# Patient Record
Sex: FEMALE | Race: WHITE | NOT HISPANIC OR LATINO | Employment: FULL TIME | URBAN - METROPOLITAN AREA
[De-identification: names, ages, dates, MRNs, and addresses within clinical notes are randomized per-mention and may not be internally consistent; named-entity substitution may affect disease eponyms.]

---

## 2017-10-20 ENCOUNTER — ALLSCRIPTS OFFICE VISIT (OUTPATIENT)
Dept: OTHER | Facility: OTHER | Age: 32
End: 2017-10-20

## 2017-10-21 NOTE — PROGRESS NOTES
Assessment  1  Generalized anxiety disorder (300 02) (F41 1)   2  History of depression (V11 8) (Z86 59)   3  No pertinent family history : Family History   4  Lives with parents   5  Currently works full-time (V49 89) (Z78 9)   6  Palpitations (785 1) (R00 2)    Plan  Generalized anxiety disorder    · Follow-up visit in 1 month Evaluation and Treatment  Follow-up  Status: Hold For -  Scheduling  Requested for: 50APG4413   · Decreasing the stress in your life may help your condition improve ; Status:Complete;    Done: 46NNY8042 12:34PM   · There are many things you can do to help control and end a panic attack ;  Status:Complete;   Done: 64SYB9278 12:34PM   · We recommend desensitization to help reduce your fears ; Status:Complete;   Done:  39QUK1961 12:34PM    Effexor 150 mg PO qd   Consider medical marijuana  Discussed treatment options, stress relief modalities, importance of social network of support  Discussion/Summary    We reviewed the inappropriate use of benzodiazepines and the diversion  I do not think it to be appropriate to continue w/ this but suggest introducing other modalities (yoga, ben chi), counseling  Trial i of an increase in the Effexor for now, consider med cannabis  The patient was counseled regarding risks and benefits of treatment options  total time of encounter was 30 minutes-- and-- 30 minutes was spent counseling  History of Present Illness  The patient is being seen for follow-up of generalized anxiety disorder  The patient reports doing poorly  Comorbid Illnesses: depression  Interval symptoms:  worsened anxiety,-- denies difficulty concentrating,-- denies restlessness,-- denies panic attacks,-- denies sleep disruption-- and-- stable depression  Exercise: She does not exercise regularly  Alcohol: She consumes alcohol  Drug Use: Drug(s) abuse includes prescription drugs-- and-- benzodiazepines  The patient is being seen for follow-up of depression   anxiety Symptoms: poor self image-- and-- irritability  She describes this as moderate in severity-- and-- unchanged  34 y/o WF first visit in --1 1/2 years to f/u BONNY and Depression  She continues to work for [employer] but also continues to be frustrated with life not proceeding in a positive way  She likes some aspects of her job but feels she does not advance because of interpersonal relationships  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 and has applied for a new job at [employer]  She is living with her parents but expresses the want to move out  She is taking Effexor although she reports inconsistency in taking the med  She has, however, been taking clonazepam lately that she gets from a friend  Review of Systems    Constitutional: as noted in HPI  Cardiovascular: palpitations  Respiratory: No complaints of shortness of breath, no wheezing, no cough, no SOB on exertion, no orthopnea, no PND  Gastrointestinal: No complaints of abdominal pain, no constipation, no nausea or vomiting, no diarrhea, no bloody stools  Genitourinary: No complaints of dysuria, no incontinence, no pelvic pain, no dysmenorrhea, no vaginal discharge or bleeding  Musculoskeletal: No complaints of arthralgias, no myalgias, no joint swelling or stiffness, no limb pain or swelling  Integumentary: No complaints of skin rash or lesions, no itching, no skin wounds, no breast pain or lump  Neurological: No complaints of headache, no confusion, no convulsions, no numbness, no dizziness or fainting, no tingling, no limb weakness, no difficulty walking  Psychiatric: as noted in HPI--    The patient presents with complaints of anxiety  Symptoms are improved by medications  Symptoms are made worse by stress, new situations and job issues  Symptoms are unchanged  Endocrine: No complaints of proptosis, no hot flashes, no muscle weakness, no deepening of the voice, no feelings of weakness     Hematologic/Lymphatic: No complaints of swollen glands, no swollen glands in the neck, does not bleed easily, does not bruise easily  ROS reviewed  Past Medical History  1  History of depression (V11 8) (Z86 59)    The active problems and past medical history were reviewed and updated today  Family History  Family History    1  No pertinent family history    The family history was reviewed and updated today  Social History   · Currently works full-time (V49 89) (Z78 9)   · Lives with parents  The social history was reviewed and updated today  The social history was reviewed and is unchanged  Allergies  1  No Known Drug Allergies    Vitals  Vital Signs    Recorded: 23XTY0646 04:01AS   Systolic 444, LUE, Sitting   Diastolic 84, LUE, Sitting   Height 5 ft 6 in   Weight 184 lb    BMI Calculated 29 7   BSA Calculated 1 93     Physical Exam    Constitutional   General appearance: No acute distress, well appearing and well nourished  Pulmonary   Respiratory effort: No increased work of breathing or signs of respiratory distress  Musculoskeletal   Gait and station: Normal     Skin   Skin and subcutaneous tissue: Normal without rashes or lesions  Neurologic   Cranial nerves: Cranial nerves 2-12 intact  Psychiatric   Orientation to person, place, and time: Normal     Mood and affect: Normal   Mood and Affect: anxious,-- frustrated,-- irritable-- and-- tearful  Results/Data  PHQ-9 Adult Depression Screening 20Oct2017 11:37AM User, s     Test Name Result Flag Reference   PHQ-9 Adult Depression Score 15     Over the last two weeks, how often have you been bothered by any of the following problems?   Little interest or pleasure in doing things: Several days - 1  Feeling down, depressed, or hopeless: More than half the days - 2  Trouble falling or staying asleep, or sleeping too much: Several days - 1  Feeling tired or having little energy: More than half the days - 2  Poor appetite or over eating: More than half the days - 2  Feeling bad about yourself - or that you are a failure or have let yourself or your family down: More than half the days - 2  Trouble concentrating on things, such as reading the newspaper or watching television: More than half the days - 2  Moving or speaking so slowly that other people could have noticed  Or the opposite -  being so fidgety or restless that you have been moving around a lot more than usual: More than half the days - 2  Thoughts that you would be better off dead, or of hurting yourself in some way: Several days - 1   PHQ-9 Adult Depression Screening Positive     PHQ-9 Difficulty Level Very difficult     PHQ-9 Severity      Moderately Severe Depression       Future Appointments    Date/Time Provider Specialty Site   11/21/2017 10:15 AM CUBA Mejía   [address] [place] [address]     Signatures   Electronically signed by : CUBA Anderson ; Oct 20 2017 12:37PM EST                       (Author)

## 2017-11-21 ENCOUNTER — ALLSCRIPTS OFFICE VISIT (OUTPATIENT)
Dept: OTHER | Facility: OTHER | Age: 32
End: 2017-11-21

## 2017-11-22 NOTE — PROGRESS NOTES
Assessment    1  Generalized anxiety disorder (300 02) (F41 1)   2  Palpitations (785 1) (R00 2)    Plan  Generalized anxiety disorder    · Continue with our present treatment plan ; Status:Complete;   Done: [id number]:81YR   · There are many things you can do to help control and end a panic attack  ;Status:Complete;   Done: 97NXN2180 11:04AM   · Follow-up visit in 2 months Evaluation and Treatment  Follow-up  Status: Hold For -Scheduling  Requested for: 21Nov2017    Discussion/Summary    Seems improved on the higher Effexor dose with less irritability and anxiousness  We will continue at the present dose for now as she looks into an alternate job situation ([employer])  We also briefly disc'd eventually moving out of her parents' house as this is another stressor for her albeit comfortable  Follow  The patient was counseled regarding instructions for management  total time of encounter was 30 minutes-- and-- 30 minutes was spent counseling  History of Present Illness  The patient is being seen for follow-up of generalized anxiety disorder  The patient reports Improved  She has no comorbid illnesses  She has had no significant interval events  Interval symptoms:  improved anxiety,-- denies difficulty concentrating,-- improved restlessness,-- improved panic attacks-- and-- denies sleep disruption  (Effexor)  Medications:  the patient is adherent to her medication regimen  32 y/o WF for f/u BONNY  At last visit we increased her Effexor and she was to be taking it regularly  She did look into medical marijuana and reports it being available in [place] in the new year  She continues to work for [employer]  Review of Systems   Constitutional: as noted in HPI  Cardiovascular: palpitations, but-- No complaints of slow heart rate, no fast heart rate, no chest pain, no palpitations, no leg claudication, no lower extremity edema    Respiratory: No complaints of shortness of breath, no wheezing, no cough, no SOB on exertion, no orthopnea, no PND  Gastrointestinal: No complaints of abdominal pain, no constipation, no nausea or vomiting, no diarrhea, no bloody stools  Genitourinary: No complaints of dysuria, no incontinence, no pelvic pain, no dysmenorrhea, no vaginal discharge or bleeding  Musculoskeletal: No complaints of arthralgias, no myalgias, no joint swelling or stiffness, no limb pain or swelling  Integumentary: No complaints of skin rash or lesions, no itching, no skin wounds, no breast pain or lump  Neurological: No complaints of headache, no confusion, no convulsions, no numbness, no dizziness or fainting, no tingling, no limb weakness, no difficulty walking  Psychiatric: as noted in HPI--   The patient presents with complaints of anxiety  Symptoms are improved by medications  Symptoms are made worse by stress, new situations and job issues  Symptoms are unchanged  Endocrine: No complaints of proptosis, no hot flashes, no muscle weakness, no deepening of the voice, no feelings of weakness  Hematologic/Lymphatic: No complaints of swollen glands, no swollen glands in the neck, does not bleed easily, does not bruise easily  ROS reviewed  Active Problems  1  Generalized anxiety disorder (300 02) (F41 1)   2  Palpitations (785 1) (R00 2)    Past Medical History  1  History of depression (V11 8) (Z86 59)    The active problems and past medical history were reviewed and updated today  Family History  Family History    1  No pertinent family history    Social History     · Currently works full-time (V49 89) (Z78 9)   · Lives with parents  The social history was reviewed and is unchanged  Allergies  1   No Known Drug Allergies    Vitals  Vital Signs    Recorded: 55UST5917 46:73DS   Systolic 92, LUE, Sitting   Diastolic 60, LUE, Sitting   Height 5 ft 6 in   Weight 184 lb    BMI Calculated 29 7   BSA Calculated 1 93       Physical Exam   Constitutional  General appearance: No acute distress, well appearing and well nourished  Pulmonary  Respiratory effort: No increased work of breathing or signs of respiratory distress  Musculoskeletal  Gait and station: Normal    Skin  Skin and subcutaneous tissue: Normal without rashes or lesions  Neurologic  Cranial nerves: Cranial nerves 2-12 intact  Psychiatric  Orientation to person, place, and time: Normal    Mood and affect: Normal   Mood and Affect: anxious,-- frustrated,-- irritable-- and-- tearful  Future Appointments    Date/Time Provider Specialty Site   01/09/2018 10:00 AM CUBA Eldridge   [address] [place] [address]       Signatures   Electronically signed by : CUBA Gutierrez ; Nov 21 2017 11:05AM EST                       (Author)

## 2018-01-09 ENCOUNTER — ALLSCRIPTS OFFICE VISIT (OUTPATIENT)
Dept: OTHER | Facility: OTHER | Age: 33
End: 2018-01-09

## 2018-01-11 NOTE — PROGRESS NOTES
Assessment   1  Generalized anxiety disorder (300 02) (F41 1)1   2  Oral contraceptive prescribed (V25 01) (Z30 011)     1 Amended By: Thelma Victoria; Markus 10 2018 8:34 AM EST      Plan   Generalized anxiety disorder    · Start: Venlafaxine HCl  MG Oral Tablet Extended Release 24 Hour; Take 1 tablet    daily  Oral contraceptive prescribed    · Start: ClonazePAM 0 5 MG Oral Tablet; TAKE 0 5 TABLET Daily PRN Anxiety   · Start: Fexofenadine HCl - 180 MG Oral Tablet; TAKE 1 TABLET DAILY AS NEEDED FOR    ALLERGIES   · Start: Fluticasone Propionate 50 MCG/ACT Nasal Suspension; USE 2 SPRAYS IN EACH    NOSTRIL ONCE DAILY   · Start: Loestrin Fe 1/20 1-20 MG-MCG Oral Tablet (Norethin Ace-Eth Estrad-FE); TAKE 1    TABLET DAILY AS DIRECTED    Discussion/Summary      BONNY: patient appears to be stable on venlafaxine 150 and 1/2 tablet clonazepam 0 5mg  She is to continue this therapy  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 which will be a better environment and time schedule for her  Follow up in 3 months when patient is back to work  Informed patient that this office will not be prescribing medical marijuana  The patient was counseled regarding instructions for management,-- impressions  Possible side effects of new medications were reviewed with the patient/guardian today  The treatment plan was reviewed with the patient/guardian  The patient/guardian understands and agrees with the treatment plan       Self Referrals: No      Chief Complaint   f/u anxiety      History of Present Illness   34 y/o F presents for f/u anxiety  She has been stable on effexor 150 mg and 1/2 clonazepam 0 5mg tab prn anxiety  She works for [employer] at [employer] and has been off work for 4 weeks due to winter break and has had a huge decrease in stress  Prior to this she felt as though her sx were well managed on her medications   She used a clonazepam about once a week for breakthrough sx, which usually occurred under high pressure at work  She is sleeping well, eating and concentrating well, and denies SI  She expresses an interest in medical marijuana  Review of Systems        Constitutional: No fever, no chills, feels well, no tiredness, no recent weight gain or weight loss  Cardiovascular: No complaints of slow heart rate, no fast heart rate, no chest pain, no palpitations, no leg claudication, no lower extremity edema  Respiratory: No complaints of shortness of breath, no wheezing, no cough, no SOB on exertion, no orthopnea, no PND  Gastrointestinal: No complaints of abdominal pain, no constipation, no nausea or vomiting, no diarrhea, no bloody stools  Neurological: No complaints of headache, no confusion, no convulsions, no numbness, no dizziness or fainting, no tingling, no limb weakness, no difficulty walking  Psychiatric: anxiety, but-- not suicidal,-- no personality change,-- no sleep disturbances,-- no depression-- and-- no emotional problems  ROS reviewed  Active Problems   1  Generalized anxiety disorder (300 02) (F41 1)  2  Oral contraceptive prescribed (V25 01) (Z30 011)  3  Palpitations (785 1) (R00 2)    Past Medical History   1  History of depression (V11 8) (Z86 59)     The active problems and past medical history were reviewed and updated today  Surgical History      The surgical history was reviewed and updated today  Family History   Family History   1  No pertinent family history     The family history was reviewed and updated today  Social History    · Currently works full-time (V49 89) (Z78 9)   · Lives with parents  The social history was reviewed and updated today  The social history was reviewed and is unchanged  Current Meds      The medication list was reviewed and updated today  Allergies   1   No Known Drug Allergies    Vitals   Vital Signs    Recorded: 75UWE1022 08:70LA   Systolic 482, LUE, Sitting   Diastolic 68, LUE, Sitting   Height 5 ft 6 in   Weight 187 lb    BMI Calculated 30 18   BSA Calculated 1 94     Physical Exam        Constitutional      General appearance: No acute distress, well appearing and well nourished  Pulmonary      Respiratory effort: No increased work of breathing or signs of respiratory distress  Auscultation of lungs: Clear to auscultation  Cardiovascular      Auscultation of heart: Normal rate and rhythm, normal S1 and S2, without murmurs  Signatures    Electronically signed by : DG Fernandez; Jan 9 2018  4:29PM EST                       (Author)     Electronically signed by : CUBA Pearce ; Markus 10 2018  8:21AM EST                           Electronically signed by : Anibal Fernandez; Markus 10 2018  8:36AM EST                       (Author)     Electronically signed by :  CUBA Pearce ; Markus 10 2018  8:57AM EST

## 2018-01-13 VITALS
WEIGHT: 184 LBS | BODY MASS INDEX: 29.57 KG/M2 | DIASTOLIC BLOOD PRESSURE: 84 MMHG | SYSTOLIC BLOOD PRESSURE: 100 MMHG | HEIGHT: 66 IN

## 2018-01-14 VITALS
WEIGHT: 184 LBS | SYSTOLIC BLOOD PRESSURE: 92 MMHG | DIASTOLIC BLOOD PRESSURE: 60 MMHG | HEIGHT: 66 IN | BODY MASS INDEX: 29.57 KG/M2

## 2018-01-23 VITALS
SYSTOLIC BLOOD PRESSURE: 102 MMHG | WEIGHT: 187 LBS | BODY MASS INDEX: 30.05 KG/M2 | DIASTOLIC BLOOD PRESSURE: 68 MMHG | HEIGHT: 66 IN

## 2018-02-19 DIAGNOSIS — F41.9 ANXIETY: Primary | ICD-10-CM

## 2018-02-19 RX ORDER — VENLAFAXINE HYDROCHLORIDE 150 MG/1
1 TABLET, EXTENDED RELEASE ORAL DAILY
COMMUNITY
Start: 2018-01-09 | End: 2018-02-19 | Stop reason: SDUPTHER

## 2018-02-20 RX ORDER — VENLAFAXINE HYDROCHLORIDE 150 MG/1
150 TABLET, EXTENDED RELEASE ORAL DAILY
Qty: 30 TABLET | Refills: 1 | Status: SHIPPED | OUTPATIENT
Start: 2018-02-20 | End: 2018-07-26 | Stop reason: SDUPTHER

## 2018-02-27 ENCOUNTER — DOCUMENTATION (OUTPATIENT)
Dept: FAMILY MEDICINE CLINIC | Facility: CLINIC | Age: 33
End: 2018-02-27

## 2018-02-27 NOTE — PROGRESS NOTES
I contacted [facility] regarding the Effexor XR that is obtained for the patient through the [facility] Rued from [facility] informed that once they received the signed Change request to make Dr Jose Luis Vieira the physician instead of Dr Burak Allen, it will take 5 to 7 days for them to process and send the medication to the practice  The change request was faxed on 2/21/18

## 2018-04-05 ENCOUNTER — OFFICE VISIT (OUTPATIENT)
Dept: FAMILY MEDICINE CLINIC | Facility: CLINIC | Age: 33
End: 2018-04-05
Payer: COMMERCIAL

## 2018-04-05 VITALS
OXYGEN SATURATION: 98 % | WEIGHT: 190 LBS | HEIGHT: 67 IN | HEART RATE: 90 BPM | BODY MASS INDEX: 29.82 KG/M2 | DIASTOLIC BLOOD PRESSURE: 64 MMHG | SYSTOLIC BLOOD PRESSURE: 110 MMHG

## 2018-04-05 DIAGNOSIS — F41.1 GENERALIZED ANXIETY DISORDER: Primary | ICD-10-CM

## 2018-04-05 DIAGNOSIS — H05.243 EXOPHTHALMOS, CONSTANT, BILATERAL: ICD-10-CM

## 2018-04-05 PROBLEM — H50.52 EXOPHORIA OF BOTH EYES: Status: RESOLVED | Noted: 2018-04-05 | Resolved: 2018-04-05

## 2018-04-05 PROBLEM — H50.52 EXOPHORIA OF BOTH EYES: Status: ACTIVE | Noted: 2018-04-05

## 2018-04-05 LAB
T3FREE SERPL-MCNC: 2.78 PG/ML (ref 2.3–4.2)
T4 FREE SERPL-MCNC: 0.91 NG/DL (ref 0.76–1.46)
TSH SERPL DL<=0.05 MIU/L-ACNC: 3.26 UIU/ML (ref 0.36–3.74)

## 2018-04-05 PROCEDURE — 99214 OFFICE O/P EST MOD 30 MIN: CPT | Performed by: PHYSICIAN ASSISTANT

## 2018-04-05 PROCEDURE — 84443 ASSAY THYROID STIM HORMONE: CPT | Performed by: PHYSICIAN ASSISTANT

## 2018-04-05 PROCEDURE — 84481 FREE ASSAY (FT-3): CPT | Performed by: PHYSICIAN ASSISTANT

## 2018-04-05 PROCEDURE — 84439 ASSAY OF FREE THYROXINE: CPT | Performed by: PHYSICIAN ASSISTANT

## 2018-04-05 PROCEDURE — 36415 COLL VENOUS BLD VENIPUNCTURE: CPT | Performed by: PHYSICIAN ASSISTANT

## 2018-04-05 RX ORDER — CLONAZEPAM 0.5 MG/1
0.5 TABLET ORAL DAILY PRN
COMMUNITY
Start: 2018-01-09 | End: 2018-04-05 | Stop reason: DRUGHIGH

## 2018-04-05 RX ORDER — FEXOFENADINE HCL 180 MG/1
1 TABLET ORAL DAILY PRN
COMMUNITY
Start: 2018-01-09

## 2018-04-05 RX ORDER — FLUTICASONE PROPIONATE 50 MCG
2 SPRAY, SUSPENSION (ML) NASAL DAILY
COMMUNITY
Start: 2018-01-09

## 2018-04-05 RX ORDER — DIPHENOXYLATE HYDROCHLORIDE AND ATROPINE SULFATE 2.5; .025 MG/1; MG/1
1 TABLET ORAL DAILY
COMMUNITY

## 2018-04-05 RX ORDER — CLONAZEPAM 1 MG/1
0.5 TABLET ORAL 2 TIMES DAILY
Qty: 30 TABLET | Refills: 0 | Status: SHIPPED | OUTPATIENT
Start: 2018-04-05 | End: 2019-09-19 | Stop reason: SDUPTHER

## 2018-04-05 RX ORDER — NORETHINDRONE ACETATE AND ETHINYL ESTRADIOL 1MG-20(21)
1 KIT ORAL DAILY
COMMUNITY
Start: 2018-01-09 | End: 2018-04-12 | Stop reason: SDUPTHER

## 2018-04-05 NOTE — PROGRESS NOTES
Assessment/Plan:    Generalized anxiety disorder  Patient reports good control currently  Continue venlafaxine 150 mg  Refilled clonazepam  Gave Dr Manuelito Richards card- alternative medicine    Exophthalmos, constant, bilateral  Ophthalmologist Dr Ita Velasco concerned for hyperthyroidism-TSH, T3, T4 drawn today  Since patient reports that her eyes were always this way and that there is no acute change, will stop workup after TSH       Diagnoses and all orders for this visit:    Generalized anxiety disorder  -     clonazePAM (KlonoPIN) 1 mg tablet; Take 0 5 tablets (0 5 mg total) by mouth 2 (two) times a day  -     T3, free  -     T4, free  -     TSH, 3rd generation    Exophthalmos, constant, bilateral  -     T3, free  -     T4, free  -     TSH, 3rd generation    Other orders  -     norethindrone-ethinyl estradiol (LOESTRIN FE 1/20) 1-20 MG-MCG per tablet; Take 1 tablet by mouth daily  -     Discontinue: clonazePAM (KlonoPIN) 0 5 mg tablet; Take 0 5 mg by mouth daily as needed  -     fexofenadine (ALLEGRA) 180 MG tablet; Take 1 tablet by mouth daily as needed  -     fluticasone (FLONASE) 50 mcg/act nasal spray; 2 sprays into each nostril daily  -     multivitamin (THERAGRAN) TABS; Take 1 tablet by mouth daily  -     Olive Leaf Extract 150 MG CAPS; Take 3 capsules by mouth daily          Subjective:      Patient ID: Judith Rodríguez is a 33 y o  female  52-year-old female history of generalized anxiety presents for follow-up  Despite stressors at home and at work, patient reports that she has had decent control of her anxiety  She feels stable on venlafaxine 150 mg with clonazepam for breakthrough symptoms  She reports that she is having to use the clonazepam less but that it works very well for when she has severe anxiety  No anhedonia, decreased concentration, change in appetite, suicidal thoughts  She reports she still able to function well at work  She is still trying to get the bakery position    Still interested in medical marijuana to treat her anxiety  Her ophthalmologist is concerned about bulging eyes  Patient reports that she has always had large eyes and that he has been seeing this since she 1st met him when she was 7years old  She has not noticed any change  Her mother has a history of thyroid cancer  She denies weight loss, diarrhea, sweating, hot or cold intolerance  She has had a longstanding history of anxiety with palpitations  Last TSH was in 2015 and was normal        The following portions of the patient's history were reviewed and updated as appropriate: allergies, current medications, past family history, past medical history, past social history, past surgical history and problem list     Review of Systems   Constitutional: Negative for diaphoresis, fatigue and fever  HENT: Negative for congestion, ear pain, hearing loss, postnasal drip, rhinorrhea and sore throat  Eyes: Negative for pain, redness and visual disturbance  Respiratory: Negative for cough, shortness of breath and wheezing  Cardiovascular: Negative for chest pain, palpitations and leg swelling  Gastrointestinal: Negative for anal bleeding, constipation, diarrhea, nausea and vomiting  Endocrine: Negative for polydipsia and polyuria  Genitourinary: Negative for dysuria, flank pain and hematuria  Musculoskeletal: Negative for arthralgias, back pain, joint swelling and myalgias  Skin: Negative for pallor, rash and wound  Neurological: Negative for dizziness, syncope, numbness and headaches  Hematological: Negative for adenopathy  Does not bruise/bleed easily  Psychiatric/Behavioral: Negative for decreased concentration, dysphoric mood, sleep disturbance and suicidal ideas  The patient is nervous/anxious            Objective:      /64 (BP Location: Left arm, Patient Position: Sitting, Cuff Size: Large)   Pulse 90   Ht 5' 7" (1 702 m)   Wt 86 2 kg (190 lb)   SpO2 98%   BMI 29 76 kg/m² Physical Exam   Constitutional: She is oriented to person, place, and time  She appears well-developed and well-nourished  No distress  HENT:   Head: Normocephalic and atraumatic  Mouth/Throat: Oropharynx is clear and moist    Eyes: Conjunctivae and EOM are normal  Pupils are equal, round, and reactive to light  Right eye exhibits no discharge  Left eye exhibits no discharge  No scleral icterus  Neck: Normal range of motion  Neck supple  No thyroid mass and no thyromegaly present  Cardiovascular: Normal rate, regular rhythm and normal heart sounds  Exam reveals no gallop and no friction rub  No murmur heard  Pulmonary/Chest: Effort normal and breath sounds normal  No respiratory distress  She has no wheezes  She has no rales  Musculoskeletal: Normal range of motion  She exhibits no edema  Lymphadenopathy:     She has no cervical adenopathy  Neurological: She is alert and oriented to person, place, and time  No cranial nerve deficit  Skin: Skin is warm and dry  No rash noted  She is not diaphoretic  No erythema  No pallor

## 2018-04-05 NOTE — ASSESSMENT & PLAN NOTE
Ophthalmologist Dr Miriam Busby concerned for hyperthyroidism-TSH, T3, T4 drawn today    Since patient reports that her eyes were always this way and that there is no acute change, will stop workup after TSH

## 2018-04-05 NOTE — ASSESSMENT & PLAN NOTE
Patient reports good control currently  Continue venlafaxine 150 mg    Refilled clonazepam  Gave Dr Roslyn Zapata card- alternative medicine

## 2018-04-12 DIAGNOSIS — Z30.41 FAMILY PLANNING, BCP (BIRTH CONTROL PILLS) MAINTENANCE: Primary | ICD-10-CM

## 2018-04-12 DIAGNOSIS — N94.6 DYSMENORRHEA: ICD-10-CM

## 2018-04-12 RX ORDER — NORETHINDRONE ACETATE AND ETHINYL ESTRADIOL 1MG-20(21)
1 KIT ORAL DAILY
Qty: 84 TABLET | Refills: 3 | Status: SHIPPED | OUTPATIENT
Start: 2018-04-12 | End: 2019-03-14 | Stop reason: SDUPTHER

## 2018-07-12 ENCOUNTER — OFFICE VISIT (OUTPATIENT)
Dept: FAMILY MEDICINE CLINIC | Facility: CLINIC | Age: 33
End: 2018-07-12
Payer: COMMERCIAL

## 2018-07-12 VITALS
HEART RATE: 112 BPM | BODY MASS INDEX: 30.92 KG/M2 | SYSTOLIC BLOOD PRESSURE: 118 MMHG | HEIGHT: 67 IN | OXYGEN SATURATION: 97 % | TEMPERATURE: 98.9 F | WEIGHT: 197 LBS | DIASTOLIC BLOOD PRESSURE: 80 MMHG

## 2018-07-12 DIAGNOSIS — F41.1 GENERALIZED ANXIETY DISORDER: Primary | ICD-10-CM

## 2018-07-12 PROBLEM — H05.243: Status: RESOLVED | Noted: 2018-04-05 | Resolved: 2018-07-12

## 2018-07-12 PROCEDURE — 99213 OFFICE O/P EST LOW 20 MIN: CPT | Performed by: PHYSICIAN ASSISTANT

## 2018-07-12 PROCEDURE — 3008F BODY MASS INDEX DOCD: CPT | Performed by: PHYSICIAN ASSISTANT

## 2018-07-12 NOTE — PROGRESS NOTES
Assessment/Plan:    Generalized anxiety disorder  Patient has been doing very on venlafaxine 150 mg with very sparingly use of klonopin for breakthrough symptoms  She may continue this regimen and f/u 6 months  Suggested she make an appt with GYN to update her PAP       Diagnoses and all orders for this visit:    Generalized anxiety disorder          Subjective:      Patient ID: Daniel Kwan is a 33 y o  female  80-year-old female presents for follow-up generalized anxiety  Anxiety has been stable on venlafaxine 150 mg as well as as needed 0 5 mg clonazepam  She has only used clonazepam about 3 times this summer  She has been adjusting to her role at [employer]  Anxiety is usually provoked by being irritated by coworkers at work  No depression, sleep disturbance, change in appetite, SI    Last pap on chart 2011- pt sees Dr Nghia Rouse and believes it was more recent than this but probably >3 years        The following portions of the patient's history were reviewed and updated as appropriate: allergies, current medications, past family history, past medical history, past social history, past surgical history and problem list     Review of Systems   Constitutional: Negative for appetite change and fatigue  Respiratory: Negative for cough and shortness of breath  Cardiovascular: Negative for chest pain  Psychiatric/Behavioral: Negative for decreased concentration, dysphoric mood, sleep disturbance and suicidal ideas  The patient is nervous/anxious  Objective:      /80   Pulse (!) 112   Temp 98 9 °F (37 2 °C)   Ht 5' 7" (1 702 m)   Wt 89 4 kg (197 lb)   SpO2 97%   BMI 30 85 kg/m²          Physical Exam   Constitutional: She is oriented to person, place, and time  She appears well-developed and well-nourished  No distress  HENT:   Head: Normocephalic and atraumatic     Mouth/Throat: Oropharynx is clear and moist    Eyes: Conjunctivae and EOM are normal  Pupils are equal, round, and reactive to light  Right eye exhibits no discharge  Left eye exhibits no discharge  No scleral icterus  Neck: Normal range of motion  Neck supple  No thyromegaly present  Cardiovascular: Normal rate, regular rhythm and normal heart sounds  Exam reveals no gallop and no friction rub  No murmur heard  Pulmonary/Chest: Effort normal and breath sounds normal  No respiratory distress  She has no wheezes  She has no rales  Musculoskeletal: Normal range of motion  She exhibits no edema  Lymphadenopathy:     She has no cervical adenopathy  Neurological: She is alert and oriented to person, place, and time  No cranial nerve deficit  Skin: Skin is warm and dry  No rash noted  She is not diaphoretic  No erythema  No pallor

## 2018-07-12 NOTE — ASSESSMENT & PLAN NOTE
Patient has been doing very on venlafaxine 150 mg with very sparingly use of klonopin for breakthrough symptoms  She may continue this regimen and f/u 6 months      Suggested she make an appt with GYN to update her PAP

## 2018-07-26 DIAGNOSIS — F41.9 ANXIETY: ICD-10-CM

## 2018-07-26 RX ORDER — VENLAFAXINE HYDROCHLORIDE 150 MG/1
150 TABLET, EXTENDED RELEASE ORAL DAILY
Qty: 30 TABLET | Refills: 11 | Status: SHIPPED | OUTPATIENT
Start: 2018-07-26 | End: 2018-12-03 | Stop reason: SDUPTHER

## 2018-12-03 DIAGNOSIS — F41.9 ANXIETY: ICD-10-CM

## 2018-12-03 RX ORDER — VENLAFAXINE HYDROCHLORIDE 150 MG/1
150 TABLET, EXTENDED RELEASE ORAL DAILY
Qty: 30 TABLET | Refills: 5 | Status: SHIPPED | OUTPATIENT
Start: 2018-12-03 | End: 2019-02-07 | Stop reason: SDUPTHER

## 2018-12-04 ENCOUNTER — TELEPHONE (OUTPATIENT)
Dept: FAMILY MEDICINE CLINIC | Facility: CLINIC | Age: 33
End: 2018-12-04

## 2018-12-17 ENCOUNTER — OFFICE VISIT (OUTPATIENT)
Dept: FAMILY MEDICINE CLINIC | Facility: CLINIC | Age: 33
End: 2018-12-17
Payer: COMMERCIAL

## 2018-12-17 VITALS
DIASTOLIC BLOOD PRESSURE: 74 MMHG | BODY MASS INDEX: 30.76 KG/M2 | WEIGHT: 196 LBS | TEMPERATURE: 98.4 F | HEIGHT: 67 IN | OXYGEN SATURATION: 97 % | HEART RATE: 86 BPM | SYSTOLIC BLOOD PRESSURE: 115 MMHG

## 2018-12-17 DIAGNOSIS — F41.1 GENERALIZED ANXIETY DISORDER: ICD-10-CM

## 2018-12-17 DIAGNOSIS — B02.9 HERPES ZOSTER WITHOUT COMPLICATION: Primary | ICD-10-CM

## 2018-12-17 PROCEDURE — 3008F BODY MASS INDEX DOCD: CPT | Performed by: PHYSICIAN ASSISTANT

## 2018-12-17 PROCEDURE — 99213 OFFICE O/P EST LOW 20 MIN: CPT | Performed by: PHYSICIAN ASSISTANT

## 2018-12-17 RX ORDER — A/SINGAPORE/GP1908/2015 IVR-180 (H1N1) (AN A/MICHIGAN/45/2015-LIKE VIRUS), A/SINGAPORE/GP2050/2015 (H3N2) (AN A/HONG KONG/4801/2014 - LIKE VIRUS), B/UTAH/9/2014 (A B/PHUKET/3073/2013-LIKE VIRUS), B/HONG KONG/259/2010 (A B/BRISBANE/60/08-LIKE VIRUS) 15; 15; 15; 15 UG/.5ML; UG/.5ML; UG/.5ML; UG/.5ML
INJECTION, SUSPENSION INTRAMUSCULAR
Refills: 0 | COMMUNITY
Start: 2018-10-24 | End: 2019-09-11 | Stop reason: SDUPTHER

## 2018-12-17 NOTE — PROGRESS NOTES
Assessment/Plan:    Herpes zoster without complication  Rash resolving and currently asymptomatic  No further tx needed    Generalized anxiety disorder  Doing well  Continue effexor 150 mg as well as prn klonopin  F/u 6 months       Diagnoses and all orders for this visit:    Herpes zoster without complication    Generalized anxiety disorder    Other orders  -     FLUCELVAX QUADRIVALENT; TO BE ADMINISTERED BY PHARMACIST FOR IMMUNIZATION          Subjective:      Patient ID: Sedrick Metzger is a 29 y o  female  28 y/o F presents for f/u shingles and anxiety  About two weeks ago pt had a right sided pruritic rash on her back  [facility], dx shingles, took full course acyclovir  No longer itchy and is resolving  Anxiety has been well controlled with effexor 150 mg daily as well as prn klonopin  Continues to have stress at work  No sleep disturbance, vision change, SI        The following portions of the patient's history were reviewed and updated as appropriate: allergies, current medications, past family history, past medical history, past social history, past surgical history and problem list     Review of Systems   Constitutional: Negative for diaphoresis, fatigue and fever  HENT: Negative for congestion, ear pain, hearing loss, postnasal drip, rhinorrhea and sore throat  Eyes: Negative for pain, redness and visual disturbance  Respiratory: Negative for cough, shortness of breath and wheezing  Cardiovascular: Negative for chest pain, palpitations and leg swelling  Gastrointestinal: Negative for anal bleeding, constipation, diarrhea, nausea and vomiting  Endocrine: Negative for polydipsia and polyuria  Genitourinary: Negative for dysuria, flank pain and hematuria  Musculoskeletal: Negative for arthralgias, back pain, joint swelling and myalgias  Skin: Positive for rash  Negative for pallor and wound  Neurological: Negative for dizziness, syncope, numbness and headaches  Hematological: Negative for adenopathy  Does not bruise/bleed easily  Psychiatric/Behavioral: Positive for agitation  Negative for dysphoric mood, sleep disturbance and suicidal ideas  The patient is nervous/anxious  Objective:      /74   Pulse 86   Temp 98 4 °F (36 9 °C)   Ht 5' 7" (1 702 m)   Wt 88 9 kg (196 lb)   SpO2 97%   BMI 30 70 kg/m²          Physical Exam   Constitutional: She is oriented to person, place, and time  She appears well-developed and well-nourished  No distress  HENT:   Head: Normocephalic and atraumatic  Mouth/Throat: Oropharynx is clear and moist    Eyes: Pupils are equal, round, and reactive to light  Conjunctivae and EOM are normal  Right eye exhibits no discharge  Left eye exhibits no discharge  No scleral icterus  Neck: Normal range of motion  Neck supple  No thyromegaly present  Cardiovascular: Normal rate, regular rhythm and normal heart sounds  Exam reveals no gallop and no friction rub  No murmur heard  Pulmonary/Chest: Effort normal and breath sounds normal  No respiratory distress  She has no wheezes  She has no rales  Musculoskeletal: Normal range of motion  She exhibits no edema  Lymphadenopathy:     She has no cervical adenopathy  Neurological: She is alert and oriented to person, place, and time  No cranial nerve deficit  Skin: Skin is warm and dry  Rash (3 hyperpigmented plaques about 1 cm wide right shoulder blade) noted  She is not diaphoretic  No erythema  No pallor

## 2019-01-07 ENCOUNTER — TELEPHONE (OUTPATIENT)
Dept: FAMILY MEDICINE CLINIC | Facility: CLINIC | Age: 34
End: 2019-01-07

## 2019-01-07 DIAGNOSIS — Z23 NEED FOR SHINGLES VACCINE: Primary | ICD-10-CM

## 2019-01-07 NOTE — TELEPHONE ENCOUNTER
She does not need a script to get the shingrix vaccine, she just has to ask for it at the pharmacy   However I'm unsure whether or not her insurance would pay for it as she is under the age of 49- pharmacy will be able to check this for her

## 2019-01-31 ENCOUNTER — TELEPHONE (OUTPATIENT)
Dept: FAMILY MEDICINE CLINIC | Facility: CLINIC | Age: 34
End: 2019-01-31

## 2019-01-31 NOTE — TELEPHONE ENCOUNTER
Pt came to office inquiring about the PAP for her Effexor  The forms were faxed 11/28/18 and again 1/15/19 with no response and no medication refill  [id number] and sp/w [facility] who stated the patient was denied because Effexor XR is no longer on the PAP program     Called patient and LMOM re: above and asking if she would like new RX to local pharmacy or [facility]

## 2019-02-07 DIAGNOSIS — F41.9 ANXIETY: ICD-10-CM

## 2019-02-08 RX ORDER — VENLAFAXINE HYDROCHLORIDE 150 MG/1
150 TABLET, EXTENDED RELEASE ORAL DAILY
Qty: 90 TABLET | Refills: 1 | Status: SHIPPED | OUTPATIENT
Start: 2019-02-08 | End: 2019-02-11 | Stop reason: SDUPTHER

## 2019-02-11 ENCOUNTER — DOCUMENTATION (OUTPATIENT)
Dept: FAMILY MEDICINE CLINIC | Facility: CLINIC | Age: 34
End: 2019-02-11

## 2019-02-11 DIAGNOSIS — F41.9 ANXIETY: ICD-10-CM

## 2019-02-11 RX ORDER — VENLAFAXINE HYDROCHLORIDE 150 MG/1
150 TABLET, EXTENDED RELEASE ORAL DAILY
Qty: 90 TABLET | Refills: 1 | Status: SHIPPED | OUTPATIENT
Start: 2019-02-11 | End: 2020-02-19 | Stop reason: SDUPTHER

## 2019-03-14 DIAGNOSIS — Z30.41 FAMILY PLANNING, BCP (BIRTH CONTROL PILLS) MAINTENANCE: ICD-10-CM

## 2019-03-14 DIAGNOSIS — N94.6 DYSMENORRHEA: ICD-10-CM

## 2019-03-15 ENCOUNTER — TELEPHONE (OUTPATIENT)
Dept: FAMILY MEDICINE CLINIC | Facility: CLINIC | Age: 34
End: 2019-03-15

## 2019-03-15 NOTE — TELEPHONE ENCOUNTER
[employer] called stating that patient needs a letter of medical necessitate for her Effexor 150 mg once daily for 90 day supply   Letter needs to be faxed to [phone]

## 2019-03-15 NOTE — LETTER
03/19/2019    To whom it may concern:    I am writing on behalf of my patient, Jerrell Membreno, to document the medical necessity of venlafaxine 150 mg tablets once daily for the treatment of generalized anxiety disorder  This letter provides information about the patient's medical history and diagnosis and a statement summarizing my treatment rationale  Patient's history and diagnosis:  Patient has struggled with anxiety and depression for several years  Prior to this dose of venlafaxine, patient had impaired quality of life due to the above illnesses  She was originally on 75 mg and this was titrated up to 150 mg which has offered significant relief and has improved her functionality  Treatment Rationale: It is best to continue Erum's current treatment regimen as it has been very effective for her  Many patient's do not do well when an effective medication is switched, even if it is in the same class as their current medication  This is very specific with antidepressants  Duration: 12 months    Summary:  In summary, Venlafaxine 150 mg tablets once daily has been a very effective regimen for this patient and exchanging for a different medication could result in impaired quality of life  It has been proven that patient's with poor mental health are more prone to physical health problems as well, thus it would be prudent to maintain her on this optimized therapy specific for her needs  Please contact me if any additional information is required to ensure the prompt approval of venlafaxine 150 mg tablets      Sincerely,    Kostas Worthington PA-C  03/19/2019

## 2019-03-18 RX ORDER — NORETHINDRONE ACETATE AND ETHINYL ESTRADIOL 1MG-20(21)
KIT ORAL
Qty: 84 TABLET | Refills: 3 | Status: SHIPPED | OUTPATIENT
Start: 2019-03-18 | End: 2020-02-25 | Stop reason: SDUPTHER

## 2019-04-15 ENCOUNTER — TELEPHONE (OUTPATIENT)
Dept: FAMILY MEDICINE CLINIC | Facility: CLINIC | Age: 34
End: 2019-04-15

## 2019-06-11 ENCOUNTER — OFFICE VISIT (OUTPATIENT)
Dept: FAMILY MEDICINE CLINIC | Facility: CLINIC | Age: 34
End: 2019-06-11
Payer: COMMERCIAL

## 2019-06-11 VITALS
HEIGHT: 67 IN | DIASTOLIC BLOOD PRESSURE: 78 MMHG | OXYGEN SATURATION: 98 % | TEMPERATURE: 97.7 F | HEART RATE: 84 BPM | SYSTOLIC BLOOD PRESSURE: 118 MMHG | BODY MASS INDEX: 30.83 KG/M2 | WEIGHT: 196.4 LBS

## 2019-06-11 DIAGNOSIS — T78.40XD ALLERGIC STATE, SUBSEQUENT ENCOUNTER: ICD-10-CM

## 2019-06-11 DIAGNOSIS — Z13.220 SCREENING, LIPID: ICD-10-CM

## 2019-06-11 DIAGNOSIS — F41.1 GENERALIZED ANXIETY DISORDER: Primary | ICD-10-CM

## 2019-06-11 DIAGNOSIS — F32.A CHRONIC DEPRESSION: ICD-10-CM

## 2019-06-11 PROCEDURE — 3008F BODY MASS INDEX DOCD: CPT | Performed by: FAMILY MEDICINE

## 2019-06-11 PROCEDURE — 99214 OFFICE O/P EST MOD 30 MIN: CPT | Performed by: FAMILY MEDICINE

## 2019-06-11 RX ORDER — GLUCOSAMINE/D3/BOSWELLIA SERRA 1500MG-400
TABLET ORAL
COMMUNITY
End: 2020-01-03 | Stop reason: ALTCHOICE

## 2019-06-11 RX ORDER — NORETHINDRONE ACETATE AND ETHINYL ESTRADIOL 1MG-20(21)
KIT ORAL
COMMUNITY
Start: 2015-09-19 | End: 2019-09-11 | Stop reason: ALTCHOICE

## 2019-06-11 RX ORDER — NORETHINDRONE ACETATE AND ETHINYL ESTRADIOL 1MG-20(21)
KIT ORAL
COMMUNITY
Start: 2019-03-18 | End: 2019-09-11 | Stop reason: SDUPTHER

## 2019-06-12 ENCOUNTER — PATIENT OUTREACH (OUTPATIENT)
Dept: FAMILY MEDICINE CLINIC | Facility: CLINIC | Age: 34
End: 2019-06-12

## 2019-06-14 ENCOUNTER — APPOINTMENT (OUTPATIENT)
Dept: LAB | Facility: CLINIC | Age: 34
End: 2019-06-14
Payer: COMMERCIAL

## 2019-06-14 DIAGNOSIS — F41.1 GENERALIZED ANXIETY DISORDER: ICD-10-CM

## 2019-06-14 DIAGNOSIS — Z13.220 SCREENING, LIPID: ICD-10-CM

## 2019-06-14 DIAGNOSIS — F32.A CHRONIC DEPRESSION: ICD-10-CM

## 2019-06-14 LAB
ALBUMIN SERPL BCP-MCNC: 3.6 G/DL (ref 3.5–5)
ALP SERPL-CCNC: 65 U/L (ref 46–116)
ALT SERPL W P-5'-P-CCNC: 18 U/L (ref 12–78)
ANION GAP SERPL CALCULATED.3IONS-SCNC: 7 MMOL/L (ref 4–13)
AST SERPL W P-5'-P-CCNC: 8 U/L (ref 5–45)
BASOPHILS # BLD AUTO: 0.04 THOUSANDS/ΜL (ref 0–0.1)
BASOPHILS NFR BLD AUTO: 1 % (ref 0–1)
BILIRUB SERPL-MCNC: 0.22 MG/DL (ref 0.2–1)
BUN SERPL-MCNC: 11 MG/DL (ref 5–25)
CALCIUM SERPL-MCNC: 9 MG/DL (ref 8.3–10.1)
CHLORIDE SERPL-SCNC: 106 MMOL/L (ref 100–108)
CHOLEST SERPL-MCNC: 214 MG/DL (ref 50–200)
CO2 SERPL-SCNC: 25 MMOL/L (ref 21–32)
CREAT SERPL-MCNC: 0.96 MG/DL (ref 0.6–1.3)
EOSINOPHIL # BLD AUTO: 0.07 THOUSAND/ΜL (ref 0–0.61)
EOSINOPHIL NFR BLD AUTO: 1 % (ref 0–6)
ERYTHROCYTE [DISTWIDTH] IN BLOOD BY AUTOMATED COUNT: 13 % (ref 11.6–15.1)
GFR SERPL CREATININE-BSD FRML MDRD: 79 ML/MIN/1.73SQ M
GLUCOSE P FAST SERPL-MCNC: 84 MG/DL (ref 65–99)
HCT VFR BLD AUTO: 42 % (ref 34.8–46.1)
HDLC SERPL-MCNC: 52 MG/DL (ref 40–60)
HGB BLD-MCNC: 13.4 G/DL (ref 11.5–15.4)
IMM GRANULOCYTES # BLD AUTO: 0.01 THOUSAND/UL (ref 0–0.2)
IMM GRANULOCYTES NFR BLD AUTO: 0 % (ref 0–2)
LDLC SERPL CALC-MCNC: 125 MG/DL (ref 0–100)
LYMPHOCYTES # BLD AUTO: 2.16 THOUSANDS/ΜL (ref 0.6–4.47)
LYMPHOCYTES NFR BLD AUTO: 39 % (ref 14–44)
MCH RBC QN AUTO: 30.1 PG (ref 26.8–34.3)
MCHC RBC AUTO-ENTMCNC: 31.9 G/DL (ref 31.4–37.4)
MCV RBC AUTO: 94 FL (ref 82–98)
MONOCYTES # BLD AUTO: 0.42 THOUSAND/ΜL (ref 0.17–1.22)
MONOCYTES NFR BLD AUTO: 8 % (ref 4–12)
NEUTROPHILS # BLD AUTO: 2.82 THOUSANDS/ΜL (ref 1.85–7.62)
NEUTS SEG NFR BLD AUTO: 51 % (ref 43–75)
NRBC BLD AUTO-RTO: 0 /100 WBCS
PLATELET # BLD AUTO: 334 THOUSANDS/UL (ref 149–390)
PMV BLD AUTO: 9.8 FL (ref 8.9–12.7)
POTASSIUM SERPL-SCNC: 3.8 MMOL/L (ref 3.5–5.3)
PROT SERPL-MCNC: 7.8 G/DL (ref 6.4–8.2)
RBC # BLD AUTO: 4.45 MILLION/UL (ref 3.81–5.12)
SODIUM SERPL-SCNC: 138 MMOL/L (ref 136–145)
T4 FREE SERPL-MCNC: 0.94 NG/DL (ref 0.76–1.46)
TRIGL SERPL-MCNC: 186 MG/DL
TSH SERPL DL<=0.05 MIU/L-ACNC: 4.73 UIU/ML (ref 0.36–3.74)
WBC # BLD AUTO: 5.52 THOUSAND/UL (ref 4.31–10.16)

## 2019-06-14 PROCEDURE — 80053 COMPREHEN METABOLIC PANEL: CPT

## 2019-06-14 PROCEDURE — 84443 ASSAY THYROID STIM HORMONE: CPT

## 2019-06-14 PROCEDURE — 84439 ASSAY OF FREE THYROXINE: CPT

## 2019-06-14 PROCEDURE — 80061 LIPID PANEL: CPT

## 2019-06-14 PROCEDURE — 85025 COMPLETE CBC W/AUTO DIFF WBC: CPT

## 2019-06-14 PROCEDURE — 36415 COLL VENOUS BLD VENIPUNCTURE: CPT

## 2019-06-17 ENCOUNTER — PATIENT OUTREACH (OUTPATIENT)
Dept: FAMILY MEDICINE CLINIC | Facility: CLINIC | Age: 34
End: 2019-06-17

## 2019-07-03 ENCOUNTER — PATIENT OUTREACH (OUTPATIENT)
Dept: FAMILY MEDICINE CLINIC | Facility: CLINIC | Age: 34
End: 2019-07-03

## 2019-07-03 ENCOUNTER — TELEPHONE (OUTPATIENT)
Dept: FAMILY MEDICINE CLINIC | Facility: CLINIC | Age: 34
End: 2019-07-03

## 2019-07-03 NOTE — PROGRESS NOTES
This CM contacted patient to inform her that her Effexor XR (Venlafaxine HCL) medication had come in and when would she be able to pick it up  Patient stated she would come in today to pick it up

## 2019-07-03 NOTE — TELEPHONE ENCOUNTER
Her labs okay except her total cholesterol ands triglyceride are slightly high   To follow with low-fat diet    Check check urinalysis the H     TSH slightly above normal   Check check TSH and lipid profile in  3 months

## 2019-07-08 ENCOUNTER — TRANSCRIBE ORDERS (OUTPATIENT)
Dept: FAMILY MEDICINE CLINIC | Facility: CLINIC | Age: 34
End: 2019-07-08

## 2019-07-08 DIAGNOSIS — E78.5 HYPERLIPIDEMIA, UNSPECIFIED HYPERLIPIDEMIA TYPE: ICD-10-CM

## 2019-07-08 DIAGNOSIS — R79.89 ELEVATED TSH: Primary | ICD-10-CM

## 2019-09-11 ENCOUNTER — OFFICE VISIT (OUTPATIENT)
Dept: FAMILY MEDICINE CLINIC | Facility: CLINIC | Age: 34
End: 2019-09-11
Payer: COMMERCIAL

## 2019-09-11 VITALS — DIASTOLIC BLOOD PRESSURE: 70 MMHG | SYSTOLIC BLOOD PRESSURE: 118 MMHG | TEMPERATURE: 97.9 F | HEART RATE: 100 BPM

## 2019-09-11 DIAGNOSIS — E78.2 MIXED HYPERLIPIDEMIA: Primary | ICD-10-CM

## 2019-09-11 DIAGNOSIS — N94.6 DYSMENORRHEA: ICD-10-CM

## 2019-09-11 DIAGNOSIS — Z23 IMMUNIZATION DUE: ICD-10-CM

## 2019-09-11 DIAGNOSIS — T78.40XD ALLERGIC STATE, SUBSEQUENT ENCOUNTER: ICD-10-CM

## 2019-09-11 DIAGNOSIS — R79.89 TSH ELEVATION: ICD-10-CM

## 2019-09-11 DIAGNOSIS — F32.A CHRONIC DEPRESSION: ICD-10-CM

## 2019-09-11 DIAGNOSIS — F41.1 GENERALIZED ANXIETY DISORDER: ICD-10-CM

## 2019-09-11 PROCEDURE — 99214 OFFICE O/P EST MOD 30 MIN: CPT | Performed by: FAMILY MEDICINE

## 2019-09-15 NOTE — PROGRESS NOTES
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 at the school            Subjective:     Patient ID: Lui Ng is a 36 y o  female      Patient is here for follow-up on her chronic medical problem  Elevated TSH  Patient denied weight gain, fatigue, or cold intolerance  Anxiety  Patient stated her anxiety is under control  Also she feel stressed at work     Denied side effect with the medication  Depression  Also her depression under control  Denied suicide or homicide  Allergy  Controlled  Obesity  Patient stated her weight is stable    Last menstrual   Patient does not get her  She is on birth control  She is not sexually active  Patient is following with gyn    Test results  Lab done on July 8, 2019  Discussed result with patient      Review of Systems   Constitutional: Negative for activity change, appetite change, chills, fatigue, fever and unexpected weight change  HENT: Negative for congestion, ear discharge, ear pain, hearing loss, nosebleeds, rhinorrhea, sinus pressure, sore throat, tinnitus, trouble swallowing and voice change  Eyes: Negative for photophobia, pain and visual disturbance  Respiratory: Negative for cough, chest tightness, shortness of breath and wheezing  Cardiovascular: Negative for chest pain, palpitations and leg swelling  Gastrointestinal: Negative for abdominal pain, anal bleeding, blood in stool, constipation, diarrhea, nausea and vomiting  Endocrine: Negative for cold intolerance, heat intolerance, polydipsia and polyuria  Genitourinary: Negative for dysuria, frequency, hematuria and urgency  Musculoskeletal: Negative for arthralgias, back pain, gait problem, joint swelling, myalgias and neck pain  Skin: Negative for rash  Neurological: Negative for dizziness, tremors, seizures, syncope, weakness, light-headedness and headaches  Hematological: Negative for adenopathy  Does not bruise/bleed easily  Psychiatric/Behavioral: Negative for agitation, behavioral problems, confusion, dysphoric mood, hallucinations and sleep disturbance  The patient is not nervous/anxious  Objective:     Physical Exam   Constitutional: She is oriented to person, place, and time  She appears well-developed and well-nourished  No distress  HENT:   Head: Normocephalic and atraumatic  Right Ear: External ear normal    Left Ear: External ear normal    Nose: Nose normal    Mouth/Throat: Oropharynx is clear and moist  No oropharyngeal exudate  Eyes: Pupils are equal, round, and reactive to light  Conjunctivae and EOM are normal  No scleral icterus  Neck: Neck supple  No JVD present  No thyromegaly present  Cardiovascular: Normal rate, regular rhythm and normal heart sounds  No murmur heard  Pulses:       Carotid pulses are 3+ on the right side, and 3+ on the left side  Extremities  No edema   Pulmonary/Chest: Effort normal and breath sounds normal    Abdominal: Soft  Bowel sounds are normal  She exhibits no distension and no mass  There is no tenderness  There is no rebound and no guarding  No hernia  Lymphadenopathy:     She has no cervical adenopathy  Neurological: She is alert and oriented to person, place, and time  No cranial nerve deficit  She exhibits normal muscle tone  Coordination normal    Skin: No rash noted  Psychiatric: She has a normal mood and affect   Her behavior is normal  Judgment and thought content normal

## 2019-09-19 ENCOUNTER — TELEPHONE (OUTPATIENT)
Dept: FAMILY MEDICINE CLINIC | Facility: CLINIC | Age: 34
End: 2019-09-19

## 2019-09-19 DIAGNOSIS — F41.1 GENERALIZED ANXIETY DISORDER: ICD-10-CM

## 2019-09-19 RX ORDER — CLONAZEPAM 1 MG/1
0.5 TABLET ORAL 2 TIMES DAILY PRN
Qty: 30 TABLET | Refills: 0 | Status: SHIPPED | OUTPATIENT
Start: 2019-09-19 | End: 2020-07-07 | Stop reason: SDUPTHER

## 2019-09-24 ENCOUNTER — TELEPHONE (OUTPATIENT)
Dept: FAMILY MEDICINE CLINIC | Facility: CLINIC | Age: 34
End: 2019-09-24

## 2019-09-24 NOTE — TELEPHONE ENCOUNTER
I reviewed her immunization record  There is no documentation about her MMR  But if patient would like to take booster  It is okay  , just make sure she is not pregnant, or had reaction to it in the past

## 2019-09-24 NOTE — TELEPHONE ENCOUNTER
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

## 2019-09-25 ENCOUNTER — CLINICAL SUPPORT (OUTPATIENT)
Dept: FAMILY MEDICINE CLINIC | Facility: CLINIC | Age: 34
End: 2019-09-25
Payer: COMMERCIAL

## 2019-09-25 DIAGNOSIS — Z23 NEED FOR VACCINATION: Primary | ICD-10-CM

## 2019-09-25 DIAGNOSIS — Z23 NEED FOR HEPATITIS VACCINATION: ICD-10-CM

## 2019-09-25 DIAGNOSIS — K22.5 ZENKER DIVERTICULA: ICD-10-CM

## 2019-09-25 PROCEDURE — 90471 IMMUNIZATION ADMIN: CPT

## 2019-09-25 PROCEDURE — 90707 MMR VACCINE SC: CPT

## 2019-09-25 NOTE — TELEPHONE ENCOUNTER
Patient states she is not pregnant and never had a reaction in the past to immunizations  She is coming in today for a MMR booster

## 2019-12-30 ENCOUNTER — TELEPHONE (OUTPATIENT)
Dept: FAMILY MEDICINE CLINIC | Facility: CLINIC | Age: 34
End: 2019-12-30

## 2019-12-30 ENCOUNTER — LAB (OUTPATIENT)
Dept: LAB | Facility: CLINIC | Age: 34
End: 2019-12-30
Payer: COMMERCIAL

## 2019-12-30 DIAGNOSIS — E03.9 ACQUIRED HYPOTHYROIDISM: Primary | ICD-10-CM

## 2019-12-30 DIAGNOSIS — E78.5 HYPERLIPIDEMIA, UNSPECIFIED HYPERLIPIDEMIA TYPE: ICD-10-CM

## 2019-12-30 DIAGNOSIS — R79.89 ELEVATED TSH: ICD-10-CM

## 2019-12-30 LAB
CHOLEST SERPL-MCNC: 232 MG/DL (ref 50–200)
HDLC SERPL-MCNC: 58 MG/DL
LDLC SERPL CALC-MCNC: 144 MG/DL (ref 0–100)
TRIGL SERPL-MCNC: 151 MG/DL
TSH SERPL DL<=0.05 MIU/L-ACNC: 9.83 UIU/ML (ref 0.36–3.74)

## 2019-12-30 PROCEDURE — 84443 ASSAY THYROID STIM HORMONE: CPT

## 2019-12-30 PROCEDURE — 36415 COLL VENOUS BLD VENIPUNCTURE: CPT

## 2019-12-30 PROCEDURE — 80061 LIPID PANEL: CPT

## 2019-12-30 RX ORDER — LEVOTHYROXINE SODIUM 0.03 MG/1
25 TABLET ORAL
Qty: 30 TABLET | Refills: 5 | Status: SHIPPED | OUTPATIENT
Start: 2019-12-30 | End: 2020-02-19 | Stop reason: SDUPTHER

## 2019-12-30 NOTE — RESULT ENCOUNTER NOTE
Please call the patient regarding her results  Thyroid under active which may explain fatigue, depression, and weight gain  Begin levothyroxine 25 mcg daily  Prescription sent to drugstore  Thyroid should be recheck in about 2 months  Cholesterol is fine

## 2019-12-30 NOTE — TELEPHONE ENCOUNTER
----- Message from Yonathan Mccloud MD sent at 12/30/2019  4:03 PM EST -----  Please call the patient regarding her results  Thyroid under active which may explain fatigue, depression, and weight gain  Begin levothyroxine 25 mcg daily  Prescription sent to drugstore  Thyroid should be recheck in about 2 months  Cholesterol is fine

## 2019-12-31 ENCOUNTER — APPOINTMENT (OUTPATIENT)
Dept: LAB | Facility: CLINIC | Age: 34
End: 2019-12-31
Payer: COMMERCIAL

## 2019-12-31 LAB
BACTERIA UR QL AUTO: ABNORMAL /HPF
BILIRUB UR QL STRIP: NEGATIVE
CLARITY UR: ABNORMAL
COLOR UR: YELLOW
GLUCOSE UR STRIP-MCNC: NEGATIVE MG/DL
HGB UR QL STRIP.AUTO: NEGATIVE
KETONES UR STRIP-MCNC: NEGATIVE MG/DL
LEUKOCYTE ESTERASE UR QL STRIP: ABNORMAL
NITRITE UR QL STRIP: POSITIVE
NON-SQ EPI CELLS URNS QL MICRO: ABNORMAL /HPF
PH UR STRIP.AUTO: 7 [PH]
PROT UR STRIP-MCNC: NEGATIVE MG/DL
RBC #/AREA URNS AUTO: ABNORMAL /HPF
SP GR UR STRIP.AUTO: 1.03 (ref 1–1.03)
UROBILINOGEN UR QL STRIP.AUTO: 0.2 E.U./DL
WBC #/AREA URNS AUTO: ABNORMAL /HPF

## 2019-12-31 PROCEDURE — 81001 URINALYSIS AUTO W/SCOPE: CPT | Performed by: FAMILY MEDICINE

## 2020-01-03 ENCOUNTER — OFFICE VISIT (OUTPATIENT)
Dept: FAMILY MEDICINE CLINIC | Facility: CLINIC | Age: 35
End: 2020-01-03
Payer: COMMERCIAL

## 2020-01-03 ENCOUNTER — TELEPHONE (OUTPATIENT)
Dept: FAMILY MEDICINE CLINIC | Facility: CLINIC | Age: 35
End: 2020-01-03

## 2020-01-03 VITALS
BODY MASS INDEX: 31.52 KG/M2 | SYSTOLIC BLOOD PRESSURE: 92 MMHG | OXYGEN SATURATION: 98 % | TEMPERATURE: 98.9 F | DIASTOLIC BLOOD PRESSURE: 62 MMHG | HEART RATE: 100 BPM | WEIGHT: 200.8 LBS | HEIGHT: 67 IN

## 2020-01-03 DIAGNOSIS — E78.2 MIXED HYPERLIPIDEMIA: ICD-10-CM

## 2020-01-03 DIAGNOSIS — F32.A CHRONIC DEPRESSION: ICD-10-CM

## 2020-01-03 DIAGNOSIS — R82.90 ABNORMAL URINE FINDING: ICD-10-CM

## 2020-01-03 DIAGNOSIS — F41.1 GENERALIZED ANXIETY DISORDER: ICD-10-CM

## 2020-01-03 DIAGNOSIS — E03.9 ACQUIRED HYPOTHYROIDISM: Primary | ICD-10-CM

## 2020-01-03 DIAGNOSIS — T78.40XD ALLERGIC STATE, SUBSEQUENT ENCOUNTER: ICD-10-CM

## 2020-01-03 PROCEDURE — 99214 OFFICE O/P EST MOD 30 MIN: CPT | Performed by: FAMILY MEDICINE

## 2020-01-03 PROCEDURE — 3008F BODY MASS INDEX DOCD: CPT | Performed by: FAMILY MEDICINE

## 2020-01-03 RX ORDER — ECHINACEA 400 MG
CAPSULE ORAL
COMMUNITY

## 2020-01-03 NOTE — PROGRESS NOTES
Assessment/Plan:       No problem-specific Assessment & Plan notes found for this encounter  Diagnoses and all orders for this visit:    Acquired hypothyroidism  Comments:  Continue levothyroxine and check TSH in 6 week  Orders:  -     TSH, 3rd generation with Free T4 reflex; Future  -     TSH, 3rd generation with Free T4 reflex; Future    Mixed hyperlipidemia  Comments: To follow with low-fat diet  Advised to walk half an hour daily    Generalized anxiety disorder  Comments:  Controlled  Advised to call Psychiatry and to follow up with    Chronic depression  Comments:  Doing well advised to follow with Psychiatry  Referral reprinted again for patient    Allergic state, subsequent encounter  Comments:  Controlled  Continue medication as directed    Abnormal urine finding  Comments:  Positive leukocyte and white blood cells  Rule out urinary tract infection  Orders:  -     Urine culture; Future    Other orders  -     Black Elderberry,Berry-Flower, 575 MG CAPS; Take by mouth        There are no Patient Instructions on file for this visit  Orders Placed This Encounter   Procedures    Urine culture     Standing Status:   Future     Standing Expiration Date:   2/3/2020    TSH, 3rd generation with Free T4 reflex     Standing Status:   Future     Standing Expiration Date:   1/3/2021    TSH, 3rd generation with Free T4 reflex     Standing Status:   Future     Standing Expiration Date:   1/3/2021         Subjective:     Patient ID: Annalee Baez is a 36 y o  female      Patient is here for follow-up on her chronic medical problems     Elevated TSH  Denied weight gain  Denied cold intolerance or fatigue  She was started on levothyroxine 3 days ago  Depression  She says under well controlled  Did not make it to see the psychiatrist yet  Denied suicide or homicide still taking her medication as directed  Anxiety  Is controlled  Patient on vacation    Sometimes when she is at work she had little anxiety  Hyperlipidemia admit to regular fat intake  Denied chest pain  Allergy  Well controlled  Taking Flonase on the daily basis and Allegra as needed  Gyn care she follow with gyn doctor Dr Ellie Vogt at [facility]  Saw her recent    Test results  Lab done on December 31, 2019 discussed result with patient      Review of Systems   Constitutional: Negative for chills, diaphoresis and fatigue  HENT: Negative for ear pain, sore throat, trouble swallowing and voice change  Eyes: Negative for visual disturbance  Respiratory: Negative for cough, chest tightness and shortness of breath  Cardiovascular: Negative for chest pain, palpitations and leg swelling  Gastrointestinal: Negative for abdominal pain, blood in stool, constipation, diarrhea and nausea  Endocrine: Negative for polydipsia and polyuria  Genitourinary: Negative for dysuria, flank pain, frequency, hematuria, pelvic pain, urgency, vaginal bleeding and vaginal discharge  Musculoskeletal: Negative for arthralgias, back pain, gait problem, myalgias and neck pain  Skin: Negative for rash  Allergic/Immunologic: Negative for food allergies  Neurological: Negative for dizziness, tremors, seizures, weakness, light-headedness, numbness and headaches  Hematological: Negative for adenopathy  Does not bruise/bleed easily  Psychiatric/Behavioral: Negative for confusion and dysphoric mood  The patient is not nervous/anxious  Objective:     Physical Exam   Constitutional: She is oriented to person, place, and time  She appears well-developed and well-nourished  No distress  HENT:   Head: Normocephalic and atraumatic  Nose: Nose normal    Mouth/Throat: Oropharynx is clear and moist  No oropharyngeal exudate  Eyes: Pupils are equal, round, and reactive to light  Conjunctivae and EOM are normal  No scleral icterus  Neck: Normal range of motion  Neck supple  No JVD present  No thyromegaly present     Cardiovascular: Normal rate, regular rhythm and normal heart sounds  No murmur heard  Extremities  No edema   Pulmonary/Chest: Effort normal and breath sounds normal    Abdominal: Soft  Bowel sounds are normal  She exhibits no distension and no mass  There is no tenderness  There is no rebound and no guarding  No hernia  Lymphadenopathy:     She has no cervical adenopathy  Neurological: She is alert and oriented to person, place, and time  No cranial nerve deficit or sensory deficit  She exhibits normal muscle tone  Coordination normal    Skin: No rash noted  Psychiatric: She has a normal mood and affect  Her behavior is normal  Judgment and thought content normal    BMI Counseling: Body mass index is 31 44 kg/m²  The BMI is above normal  Nutrition recommendations include decreasing portion sizes and reducing intake of saturated and trans fat

## 2020-01-03 NOTE — TELEPHONE ENCOUNTER
Patient is requesting a patient assistance program form for medication, effexor xr 150mg  Please help the patient on getting this help, she has had it before done   thanks

## 2020-01-07 ENCOUNTER — PATIENT OUTREACH (OUTPATIENT)
Dept: FAMILY MEDICINE CLINIC | Facility: CLINIC | Age: 35
End: 2020-01-07

## 2020-01-07 NOTE — PROGRESS NOTES
This CM contacted patient confirming her request for a refill of her Effexor XR 150mg via the pharmaceutical assistance program  This CM informed her that an application for medication refill would be submitted as soon as possible however there was no guarantee that they would again fill it  CM is uncertain of the number of times the program will provide the assistance  Patient verbalized understanding

## 2020-01-08 ENCOUNTER — TELEPHONE (OUTPATIENT)
Dept: FAMILY MEDICINE CLINIC | Facility: CLINIC | Age: 35
End: 2020-01-08

## 2020-01-08 NOTE — TELEPHONE ENCOUNTER
Please call patient, let her know [employer], not providing Effexor  They recommend a replacement with similar medication, ask her if she is agreeable to change

## 2020-01-08 NOTE — TELEPHONE ENCOUNTER
Patient states that the effexor is working she will call her insurance and see if they would cover it  I advised patient to call us if she needs anything from us

## 2020-01-27 ENCOUNTER — OFFICE VISIT (OUTPATIENT)
Dept: URGENT CARE | Facility: MEDICAL CENTER | Age: 35
End: 2020-01-27
Payer: COMMERCIAL

## 2020-01-27 VITALS
HEIGHT: 67 IN | WEIGHT: 190 LBS | BODY MASS INDEX: 29.82 KG/M2 | DIASTOLIC BLOOD PRESSURE: 73 MMHG | OXYGEN SATURATION: 98 % | TEMPERATURE: 98.5 F | HEART RATE: 73 BPM | SYSTOLIC BLOOD PRESSURE: 129 MMHG | RESPIRATION RATE: 16 BRPM

## 2020-01-27 DIAGNOSIS — S90.212A SUBUNGUAL HEMATOMA OF GREAT TOE OF LEFT FOOT, INITIAL ENCOUNTER: Primary | ICD-10-CM

## 2020-01-27 PROCEDURE — 99213 OFFICE O/P EST LOW 20 MIN: CPT | Performed by: PHYSICIAN ASSISTANT

## 2020-01-27 RX ORDER — CEPHALEXIN 500 MG/1
500 CAPSULE ORAL EVERY 8 HOURS SCHEDULED
Qty: 15 CAPSULE | Refills: 0 | Status: SHIPPED | OUTPATIENT
Start: 2020-01-27 | End: 2020-02-01

## 2020-01-27 RX ORDER — MUPIROCIN CALCIUM 20 MG/G
CREAM TOPICAL 3 TIMES DAILY
Qty: 15 G | Refills: 0 | Status: SHIPPED | OUTPATIENT
Start: 2020-01-27 | End: 2020-07-07 | Stop reason: ALTCHOICE

## 2020-01-28 NOTE — PROGRESS NOTES
[facility]        NAME: Ulis Brunner is a 36 y o  female  : 1985    MRN: [id number]  DATE: 2020  TIME: 11:06 PM    Assessment and Plan   Subungual hematoma of great toe of left foot, initial encounter [S90 212A]  1  Subungual hematoma of great toe of left foot, initial encounter  cephalexin (KEFLEX) 500 mg capsule    mupirocin (BACTROBAN) 2 % cream         Patient Instructions     Take keflex, 3 times a day, for the next 5 days  Take with food to prevent stomach upset  Use mupirocin ointment on your big toe  Keep clean and dry  Can air it out while at home  While working, wear a bandage and socks  Follow up with PCP in 3-5 days if symptoms do not get better  Go to the ER if symptoms become severe  Chief Complaint     Chief Complaint   Patient presents with    Toe Pain     3/10 left great toe pain with drainage that began a few days ago  History of Present Illness       Left great toe pain x a couple days    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  She felt like her toe had a lot of pressure on it in her shoes     Pt states that her toe started oozing tonight    Toe Pain    The incident occurred 3 to 5 days ago  There was no injury mechanism  The pain is present in the left toes  Quality: feels like pressure  The pain is at a severity of 3/10  The pain has been fluctuating since onset  Pertinent negatives include no inability to bear weight, loss of motion, loss of sensation, numbness or tingling  She reports no foreign bodies present  Treatments tried: advil  The treatment provided mild relief  Review of Systems   Review of Systems   Constitutional: Negative for activity change, appetite change, chills, fatigue and fever  Respiratory: Negative for apnea, cough, choking, chest tightness, shortness of breath and wheezing  Cardiovascular: Negative for chest pain, palpitations and leg swelling     Musculoskeletal: Negative for arthralgias, back pain, gait problem, joint swelling, myalgias, neck pain and neck stiffness  Positive for left big toe pain    Skin: Negative for color change, pallor, rash and wound  Neurological: Negative for tingling and numbness  Current Medications       Current Outpatient Medications:     Black Elderberry,Berry-Flower, 575 MG CAPS, Take by mouth, Disp: , Rfl:     BLISOVI FE 1/20 1-20 MG-MCG per tablet, TAKE 1 TABLET DAILY, Disp: 84 tablet, Rfl: 3    cephalexin (KEFLEX) 500 mg capsule, Take 1 capsule (500 mg total) by mouth every 8 (eight) hours for 5 days, Disp: 15 capsule, Rfl: 0    clonazePAM (KlonoPIN) 1 mg tablet, Take 0 5 tablets (0 5 mg total) by mouth 2 (two) times a day as needed for anxiety, Disp: 30 tablet, Rfl: 0    fexofenadine (ALLEGRA) 180 MG tablet, Take 1 tablet by mouth daily as needed, Disp: , Rfl:     fluticasone (FLONASE) 50 mcg/act nasal spray, 2 sprays into each nostril daily, Disp: , Rfl:     levothyroxine 25 mcg tablet, Take 1 tablet (25 mcg total) by mouth daily in the early morning, Disp: 30 tablet, Rfl: 5    multivitamin (THERAGRAN) TABS, Take 1 tablet by mouth daily, Disp: , Rfl:     mupirocin (BACTROBAN) 2 % cream, Apply topically 3 (three) times a day, Disp: 15 g, Rfl: 0    Olive Leaf Extract 150 MG CAPS, Take 3 capsules by mouth daily, Disp: , Rfl:     venlafaxine 150 MG TB24, Take 1 tablet (150 mg total) by mouth daily, Disp: 90 tablet, Rfl: 1    Current Allergies     Allergies as of 01/27/2020    (No Known Allergies)            The following portions of the patient's history were reviewed and updated as appropriate: allergies, current medications, past family history, past medical history, past social history, past surgical history and problem list      Past Medical History:   Diagnosis Date    Acquired hypothyroidism 12/30/2019    Anxiety     Depression     last assessed 10/20/17       History reviewed  No pertinent surgical history      Family History   Problem Relation Age of Onset    No Known Problems Family          Medications have been verified  Objective   /73   Pulse 73   Temp 98 5 °F (36 9 °C)   Resp 16   Ht 5' 7" (1 702 m)   Wt 86 2 kg (190 lb)   SpO2 98%   BMI 29 76 kg/m²        Physical Exam     Physical Exam   Constitutional: She is oriented to person, place, and time  She appears well-developed and well-nourished  No distress  Musculoskeletal: Normal range of motion  She exhibits tenderness (mild tenderness of toenail of left big toe)  She exhibits no edema or deformity  Mild subungual hematoma noted under left big toenail  Toenail is draining serosanguinous fluid when pressed on  Neurological: She is alert and oriented to person, place, and time  Skin: Skin is warm and dry  No rash noted  She is not diaphoretic  No erythema  No pallor  Psychiatric: She has a normal mood and affect  Her behavior is normal    Nursing note and vitals reviewed

## 2020-01-28 NOTE — PATIENT INSTRUCTIONS
Take keflex, 3 times a day, for the next 5 days  Take with food to prevent stomach upset  Use mupirocin ointment on your big toe  Keep clean and dry  Can air it out while at home  While working, wear a bandage and socks  Follow up with PCP in 3-5 days if symptoms do not get better  Go to the ER if symptoms become severe  Subungual Hematoma   WHAT YOU NEED TO KNOW:   A subungual hematoma is a collection of blood under your fingernail or toenail  DISCHARGE INSTRUCTIONS:   Medicines:   · NSAIDs  help decrease swelling and pain  This medicine is available without a doctor's order  NSAIDs can cause stomach bleeding or kidney problems  If you take blood thinner medicine, always ask your healthcare provider if NSAIDs are safe for you  Always read the medicine label and follow directions  · Take your medicine as directed  Contact your healthcare provider if you think your medicine is not helping or if you have side effects  Tell him of her if you are allergic to any medicine  Keep a list of the medicines, vitamins, and herbs you take  Include the amounts, and when and why you take them  Bring the list or the pill bottles to follow-up visits  Carry your medicine list with you in case of an emergency  Follow up with your healthcare provider as directed:  Write down your questions so you remember to ask them during your visits  Self-care:   · Ice and elevate your affected finger or toe  Place ice wrapped in a towel over the painful area for as long as directed  Elevate your hand or foot on pillows above the level of your heart to help decrease swelling and pain  · Keep your injured finger or toe dry for as long as directed  · Gently trim your nail if it begins to fall off in pieces  This may decrease your risk for catching the nail on an object or ripping it off  · Wear shoes that are comfortable and fit correctly to prevent more injury to your toe    Contact your healthcare provider if:   · You have increased redness, swelling, or pain  · You notice pus or a bad smell coming from your nail  · You see red streaks on your finger or toe that starts from your nail  · Your nail falls off and there is bleeding  · You have questions or concerns about your condition or care  © 2017 [employer] Information is for End User's use only and may not be sold, redistributed or otherwise used for commercial purposes  All illustrations and images included in CareNotes® are the copyrighted property of A D [employer] M [employer] Inc  or [employer]  The above information is an  only  It is not intended as medical advice for individual conditions or treatments  Talk to your doctor, nurse or pharmacist before following any medical regimen to see if it is safe and effective for you

## 2020-02-19 DIAGNOSIS — F41.9 ANXIETY: ICD-10-CM

## 2020-02-19 DIAGNOSIS — E03.9 ACQUIRED HYPOTHYROIDISM: ICD-10-CM

## 2020-02-19 RX ORDER — LEVOTHYROXINE SODIUM 0.03 MG/1
25 TABLET ORAL
Qty: 30 TABLET | Refills: 5 | Status: SHIPPED | OUTPATIENT
Start: 2020-02-19 | End: 2021-02-14

## 2020-02-19 RX ORDER — VENLAFAXINE HYDROCHLORIDE 150 MG/1
150 TABLET, EXTENDED RELEASE ORAL DAILY
Qty: 90 TABLET | Refills: 1 | Status: SHIPPED | OUTPATIENT
Start: 2020-02-19 | End: 2020-02-24 | Stop reason: SDUPTHER

## 2020-02-24 ENCOUNTER — TELEPHONE (OUTPATIENT)
Dept: OTHER | Facility: OTHER | Age: 35
End: 2020-02-24

## 2020-02-24 ENCOUNTER — TELEPHONE (OUTPATIENT)
Dept: FAMILY MEDICINE CLINIC | Facility: CLINIC | Age: 35
End: 2020-02-24

## 2020-02-24 DIAGNOSIS — F41.9 ANXIETY: ICD-10-CM

## 2020-02-24 RX ORDER — VENLAFAXINE HYDROCHLORIDE 150 MG/1
150 TABLET, EXTENDED RELEASE ORAL DAILY
Qty: 30 TABLET | Refills: 0 | Status: SHIPPED | OUTPATIENT
Start: 2020-02-24 | End: 2020-07-07 | Stop reason: ALTCHOICE

## 2020-02-24 NOTE — TELEPHONE ENCOUNTER
Patient has not received her Effexor from [facility] yet and is asking for a 30 day be sent to her retail pharmacy  Thank you

## 2020-02-25 DIAGNOSIS — Z30.41 FAMILY PLANNING, BCP (BIRTH CONTROL PILLS) MAINTENANCE: ICD-10-CM

## 2020-02-25 DIAGNOSIS — N94.6 DYSMENORRHEA: ICD-10-CM

## 2020-02-25 NOTE — TELEPHONE ENCOUNTER
PT Calling to notify that she couldn't get her medication (Venlafaxine) because her pharmacy didn't have the prior-authorization for her medication  Please call PT and follow up   Thanks

## 2020-02-25 NOTE — TELEPHONE ENCOUNTER
Patient is requesting a 30day supply for her venlafaxine since she is out and the [facility] a prior authorization that was done today and it would take 72 hours for the patient to get a response if its approve by [facility]

## 2020-02-26 ENCOUNTER — TELEPHONE (OUTPATIENT)
Dept: FAMILY MEDICINE CLINIC | Facility: CLINIC | Age: 35
End: 2020-02-26

## 2020-02-26 RX ORDER — NORETHINDRONE ACETATE AND ETHINYL ESTRADIOL 1MG-20(21)
1 KIT ORAL DAILY
Qty: 84 TABLET | Refills: 0 | Status: SHIPPED | OUTPATIENT
Start: 2020-02-26 | End: 2020-05-20

## 2020-02-26 NOTE — TELEPHONE ENCOUNTER
Patient called to refill her birth control she said she has been on them for 15 years  I did counsek patient about side effect of birth control, including blood clots ,liver tumor, weight gain,hyperlipidemia hypertension and others  Patient she is aware     Advised patient to come to the office for breast and gyn exam , she said she will do that early spring this year

## 2020-03-02 ENCOUNTER — TELEPHONE (OUTPATIENT)
Dept: FAMILY MEDICINE CLINIC | Facility: CLINIC | Age: 35
End: 2020-03-02

## 2020-03-02 NOTE — TELEPHONE ENCOUNTER
I called [facility] regarding her Venlafaxine 150 mg  They will send this to her with a 90 day supply with refills  It was approved  Patient is aware

## 2020-03-02 NOTE — TELEPHONE ENCOUNTER
I spoke with [facility] regarding  The patient's Levothyroxine 25 mcg  The problem was that it needed to be a 90 day supply with 3 refills, So I did ok this for the patient, so she was able to get her prescription

## 2020-03-04 ENCOUNTER — APPOINTMENT (OUTPATIENT)
Dept: LAB | Facility: CLINIC | Age: 35
End: 2020-03-04
Payer: COMMERCIAL

## 2020-03-04 DIAGNOSIS — E03.9 ACQUIRED HYPOTHYROIDISM: ICD-10-CM

## 2020-03-04 LAB — TSH SERPL DL<=0.05 MIU/L-ACNC: 2.64 UIU/ML (ref 0.36–3.74)

## 2020-03-04 PROCEDURE — 36415 COLL VENOUS BLD VENIPUNCTURE: CPT

## 2020-03-04 PROCEDURE — 84443 ASSAY THYROID STIM HORMONE: CPT

## 2020-04-02 RX ORDER — VENLAFAXINE HYDROCHLORIDE 150 MG/1
CAPSULE, EXTENDED RELEASE ORAL
COMMUNITY
Start: 2020-03-02 | End: 2020-12-28 | Stop reason: SDUPTHER

## 2020-04-06 ENCOUNTER — TELEMEDICINE (OUTPATIENT)
Dept: FAMILY MEDICINE CLINIC | Facility: CLINIC | Age: 35
End: 2020-04-06
Payer: COMMERCIAL

## 2020-04-06 VITALS — BODY MASS INDEX: 30.7 KG/M2 | WEIGHT: 196 LBS | TEMPERATURE: 97.5 F

## 2020-04-06 DIAGNOSIS — F41.1 GENERALIZED ANXIETY DISORDER: ICD-10-CM

## 2020-04-06 DIAGNOSIS — R82.90 ABNORMAL FINDING ON URINALYSIS: ICD-10-CM

## 2020-04-06 DIAGNOSIS — R03.0 BLOOD PRESSURE ELEVATED WITHOUT HISTORY OF HTN: ICD-10-CM

## 2020-04-06 DIAGNOSIS — F32.A CHRONIC DEPRESSION: ICD-10-CM

## 2020-04-06 DIAGNOSIS — E03.9 ACQUIRED HYPOTHYROIDISM: Primary | ICD-10-CM

## 2020-04-06 DIAGNOSIS — E78.2 MIXED HYPERLIPIDEMIA: ICD-10-CM

## 2020-04-06 PROCEDURE — 99214 OFFICE O/P EST MOD 30 MIN: CPT | Performed by: FAMILY MEDICINE

## 2020-05-20 DIAGNOSIS — Z30.41 FAMILY PLANNING, BCP (BIRTH CONTROL PILLS) MAINTENANCE: ICD-10-CM

## 2020-05-20 DIAGNOSIS — N94.6 DYSMENORRHEA: ICD-10-CM

## 2020-05-20 RX ORDER — NORETHINDRONE ACETATE AND ETHINYL ESTRADIOL 1MG-20(21)
KIT ORAL
Qty: 84 TABLET | Refills: 0 | Status: SHIPPED | OUTPATIENT
Start: 2020-05-20

## 2020-06-11 LAB
HCV AB SER-ACNC: NEGATIVE
HIV-1/2 AB-AG (HISTORICAL): NEGATIVE

## 2020-07-03 ENCOUNTER — APPOINTMENT (OUTPATIENT)
Dept: LAB | Facility: MEDICAL CENTER | Age: 35
End: 2020-07-03
Payer: COMMERCIAL

## 2020-07-03 DIAGNOSIS — E03.9 ACQUIRED HYPOTHYROIDISM: ICD-10-CM

## 2020-07-03 DIAGNOSIS — F41.1 GENERALIZED ANXIETY DISORDER: ICD-10-CM

## 2020-07-03 DIAGNOSIS — R82.90 ABNORMAL FINDING ON URINALYSIS: ICD-10-CM

## 2020-07-03 DIAGNOSIS — E78.2 MIXED HYPERLIPIDEMIA: ICD-10-CM

## 2020-07-03 LAB
ALBUMIN SERPL BCP-MCNC: 3.8 G/DL (ref 3.5–5)
ALP SERPL-CCNC: 69 U/L (ref 46–116)
ALT SERPL W P-5'-P-CCNC: 23 U/L (ref 12–78)
ANION GAP SERPL CALCULATED.3IONS-SCNC: 5 MMOL/L (ref 4–13)
AST SERPL W P-5'-P-CCNC: 12 U/L (ref 5–45)
BASOPHILS # BLD AUTO: 0.03 THOUSANDS/ΜL (ref 0–0.1)
BASOPHILS NFR BLD AUTO: 1 % (ref 0–1)
BILIRUB SERPL-MCNC: 0.3 MG/DL (ref 0.2–1)
BILIRUB UR QL STRIP: NEGATIVE
BUN SERPL-MCNC: 14 MG/DL (ref 5–25)
CALCIUM SERPL-MCNC: 9.5 MG/DL (ref 8.3–10.1)
CHLORIDE SERPL-SCNC: 107 MMOL/L (ref 100–108)
CHOLEST SERPL-MCNC: 218 MG/DL (ref 50–200)
CLARITY UR: NORMAL
CO2 SERPL-SCNC: 28 MMOL/L (ref 21–32)
COLOR UR: YELLOW
CREAT SERPL-MCNC: 0.96 MG/DL (ref 0.6–1.3)
EOSINOPHIL # BLD AUTO: 0.11 THOUSAND/ΜL (ref 0–0.61)
EOSINOPHIL NFR BLD AUTO: 2 % (ref 0–6)
ERYTHROCYTE [DISTWIDTH] IN BLOOD BY AUTOMATED COUNT: 13 % (ref 11.6–15.1)
GFR SERPL CREATININE-BSD FRML MDRD: 78 ML/MIN/1.73SQ M
GLUCOSE P FAST SERPL-MCNC: 83 MG/DL (ref 65–99)
GLUCOSE UR STRIP-MCNC: NEGATIVE MG/DL
HCT VFR BLD AUTO: 43.1 % (ref 34.8–46.1)
HDLC SERPL-MCNC: 59 MG/DL
HGB BLD-MCNC: 13.7 G/DL (ref 11.5–15.4)
HGB UR QL STRIP.AUTO: NEGATIVE
IMM GRANULOCYTES # BLD AUTO: 0.01 THOUSAND/UL (ref 0–0.2)
IMM GRANULOCYTES NFR BLD AUTO: 0 % (ref 0–2)
KETONES UR STRIP-MCNC: NEGATIVE MG/DL
LDLC SERPL CALC-MCNC: 129 MG/DL (ref 0–100)
LEUKOCYTE ESTERASE UR QL STRIP: NEGATIVE
LYMPHOCYTES # BLD AUTO: 2.27 THOUSANDS/ΜL (ref 0.6–4.47)
LYMPHOCYTES NFR BLD AUTO: 39 % (ref 14–44)
MCH RBC QN AUTO: 30.6 PG (ref 26.8–34.3)
MCHC RBC AUTO-ENTMCNC: 31.8 G/DL (ref 31.4–37.4)
MCV RBC AUTO: 96 FL (ref 82–98)
MONOCYTES # BLD AUTO: 0.53 THOUSAND/ΜL (ref 0.17–1.22)
MONOCYTES NFR BLD AUTO: 9 % (ref 4–12)
NEUTROPHILS # BLD AUTO: 2.86 THOUSANDS/ΜL (ref 1.85–7.62)
NEUTS SEG NFR BLD AUTO: 49 % (ref 43–75)
NITRITE UR QL STRIP: NEGATIVE
NRBC BLD AUTO-RTO: 0 /100 WBCS
PH UR STRIP.AUTO: 6 [PH]
PLATELET # BLD AUTO: 296 THOUSANDS/UL (ref 149–390)
PMV BLD AUTO: 9.5 FL (ref 8.9–12.7)
POTASSIUM SERPL-SCNC: 3.9 MMOL/L (ref 3.5–5.3)
PROT SERPL-MCNC: 7.8 G/DL (ref 6.4–8.2)
PROT UR STRIP-MCNC: NEGATIVE MG/DL
RBC # BLD AUTO: 4.47 MILLION/UL (ref 3.81–5.12)
SODIUM SERPL-SCNC: 140 MMOL/L (ref 136–145)
SP GR UR STRIP.AUTO: 1.02 (ref 1–1.03)
TRIGL SERPL-MCNC: 152 MG/DL
TSH SERPL DL<=0.05 MIU/L-ACNC: 3.07 UIU/ML (ref 0.36–3.74)
UROBILINOGEN UR QL STRIP.AUTO: 0.2 E.U./DL
WBC # BLD AUTO: 5.81 THOUSAND/UL (ref 4.31–10.16)

## 2020-07-03 PROCEDURE — 80061 LIPID PANEL: CPT

## 2020-07-03 PROCEDURE — 84443 ASSAY THYROID STIM HORMONE: CPT

## 2020-07-03 PROCEDURE — 85025 COMPLETE CBC W/AUTO DIFF WBC: CPT

## 2020-07-03 PROCEDURE — 81003 URINALYSIS AUTO W/O SCOPE: CPT | Performed by: FAMILY MEDICINE

## 2020-07-03 PROCEDURE — 80053 COMPREHEN METABOLIC PANEL: CPT

## 2020-07-03 PROCEDURE — 36415 COLL VENOUS BLD VENIPUNCTURE: CPT

## 2020-07-03 PROCEDURE — 87086 URINE CULTURE/COLONY COUNT: CPT

## 2020-07-04 LAB — BACTERIA UR CULT: NORMAL

## 2020-07-06 ENCOUNTER — TELEPHONE (OUTPATIENT)
Dept: ADMINISTRATIVE | Facility: OTHER | Age: 35
End: 2020-07-06

## 2020-07-06 NOTE — TELEPHONE ENCOUNTER
----- Message from Jamia Kitchen sent at 7/6/2020  3:48 PM EDT -----  Regarding: Cervical  07/06/20 3:49 PM    Sherif, our patient Bertram Membreno has had Pap Smear (HPV) aka Cervical Cancer Screening completed/performed  Please assist in updating the patient chart by pulling the Care Everywhere (CE) document  The date of service is 6/11/2020       Thank you,  KARLA BOWIE   [facility]

## 2020-07-06 NOTE — TELEPHONE ENCOUNTER
Upon review of the In Basket request we were able to locate, review, and update the patient chart as requested for Pap Smear (HPV) aka Cervical Cancer Screening  Any additional questions or concerns should be emailed to the Practice Liaisons via [email or web address] email, please do not reply via In Basket      Thank you  Farhat Campbell

## 2020-07-07 ENCOUNTER — OFFICE VISIT (OUTPATIENT)
Dept: FAMILY MEDICINE CLINIC | Facility: CLINIC | Age: 35
End: 2020-07-07
Payer: COMMERCIAL

## 2020-07-07 VITALS
WEIGHT: 201 LBS | OXYGEN SATURATION: 98 % | SYSTOLIC BLOOD PRESSURE: 100 MMHG | BODY MASS INDEX: 31.55 KG/M2 | TEMPERATURE: 97.8 F | DIASTOLIC BLOOD PRESSURE: 74 MMHG | HEART RATE: 76 BPM | HEIGHT: 67 IN

## 2020-07-07 DIAGNOSIS — F32.A CHRONIC DEPRESSION: ICD-10-CM

## 2020-07-07 DIAGNOSIS — E78.2 MIXED HYPERLIPIDEMIA: ICD-10-CM

## 2020-07-07 DIAGNOSIS — F41.1 GENERALIZED ANXIETY DISORDER: Primary | ICD-10-CM

## 2020-07-07 DIAGNOSIS — R03.0 BLOOD PRESSURE ELEVATED WITHOUT HISTORY OF HTN: ICD-10-CM

## 2020-07-07 DIAGNOSIS — N92.6 MENSTRUAL PERIODS IRREGULAR: ICD-10-CM

## 2020-07-07 PROCEDURE — 3008F BODY MASS INDEX DOCD: CPT | Performed by: FAMILY MEDICINE

## 2020-07-07 PROCEDURE — 99214 OFFICE O/P EST MOD 30 MIN: CPT | Performed by: FAMILY MEDICINE

## 2020-07-07 PROCEDURE — 1036F TOBACCO NON-USER: CPT | Performed by: FAMILY MEDICINE

## 2020-07-07 RX ORDER — CLONAZEPAM 1 MG/1
0.5 TABLET ORAL 2 TIMES DAILY PRN
Qty: 30 TABLET | Refills: 0 | Status: SHIPPED | OUTPATIENT
Start: 2020-07-07 | End: 2021-03-25 | Stop reason: SDUPTHER

## 2020-07-07 RX ORDER — CLONAZEPAM 1 MG/1
0.5 TABLET ORAL 2 TIMES DAILY PRN
Qty: 30 TABLET | Refills: 0 | Status: SHIPPED | OUTPATIENT
Start: 2020-07-07 | End: 2020-07-07 | Stop reason: SDUPTHER

## 2020-07-07 NOTE — PROGRESS NOTES
Assessment/Plan:       No problem-specific Assessment & Plan notes found for this encounter  Diagnoses and all orders for this visit:    Generalized anxiety disorder  Comments:  Controlled  Continue Effexor  Keep Klonopin only p r n  Advised about drowsiness addiction and tolerance  Orders:  -     Discontinue: clonazePAM (KlonoPIN) 1 mg tablet; Take 0 5 tablets (0 5 mg total) by mouth 2 (two) times a day as needed for anxiety  -     Discontinue: clonazePAM (KlonoPIN) 1 mg tablet; Take 0 5 tablets (0 5 mg total) by mouth 2 (two) times a day as needed for anxiety  -     clonazePAM (KlonoPIN) 1 mg tablet; Take 0 5 tablets (0 5 mg total) by mouth 2 (two) times a day as needed for anxiety    Chronic depression  Comments:  Doing okay   continue medication  To follow up with Psychiatry    Mixed hyperlipidemia  Comments: To follow with low-fat diet    Blood pressure elevated without history of HTN  Comments:  Blood pressure normal or repeat advised to check blood pressure at home once a week  And to call if her blood pressure 130/80 or higher    Menstrual periods irregular  Comments: Following with gyn  Discussed to check urine pregnancy test since she is on medication  Patient declined she said she is not sexually active        Patient Instructions   Check check blood pressure at home if anything 130/80 or higher , to call me back      No orders of the defined types were placed in this encounter  Subjective:     Patient ID: Robert Leach is a 36 y o  female      HPI  Patient is here for follow-up chronic medical problems  Anxiety patient stated is controlled with Effexor  Denied side effect  Patient stated she takes Klonopin half tablet about 2 to 3 times a month only  Depression  Overall she is doing well  Sometimes she feel little down just because she had to change her job because of the [employer]  But in 2 week she will be back to her old job  Denied suicide or homicide     was referred to Psychiatry but she never made an appointment  Hypothyroid  Denied significant weight gain, fatigue or cold intolerance  Regular menstrualperiod  She did see gyn recently  She was told because she has been on the birth control for a long time  Patient has not been sexually active for at least a year  Hyperlipidemia admit to regular fat intake  Denied chest pain  Elevated blood pressure  Denied flushing, polyuria or polydipsia    Results  Lab done on Daisy 3, 2020 discussed with pt    Review of Systems   Constitutional: Negative for activity change, appetite change, chills, fatigue, fever and unexpected weight change  HENT: Negative for congestion, ear discharge, ear pain, hearing loss, nosebleeds, rhinorrhea, sinus pressure, sore throat, tinnitus, trouble swallowing and voice change  Eyes: Negative for photophobia, pain and visual disturbance  Respiratory: Negative for cough, chest tightness, shortness of breath and wheezing  Cardiovascular: Negative for chest pain, palpitations and leg swelling  Gastrointestinal: Negative for abdominal pain, anal bleeding, blood in stool, constipation, diarrhea, nausea and vomiting  Endocrine: Negative for cold intolerance, heat intolerance, polydipsia and polyuria  Genitourinary: Negative for dysuria, frequency, hematuria, urgency, vaginal bleeding, vaginal discharge and vaginal pain  Musculoskeletal: Negative for arthralgias, back pain, gait problem, joint swelling, myalgias and neck pain  Skin: Negative for rash  Neurological: Negative for dizziness, tremors, seizures, syncope, weakness, light-headedness and headaches  Hematological: Negative for adenopathy  Does not bruise/bleed easily  Psychiatric/Behavioral: Negative for agitation, behavioral problems, confusion, dysphoric mood, hallucinations and sleep disturbance  The patient is not nervous/anxious          Objective:     Physical Exam   Constitutional: She is oriented to person, place, and time  She appears well-developed and well-nourished  No distress  HENT:   Head: Normocephalic and atraumatic  Eyes: Pupils are equal, round, and reactive to light  Conjunctivae and EOM are normal  No scleral icterus  Neck: Neck supple  No JVD present  No thyromegaly present  Cardiovascular: Normal rate, regular rhythm and normal heart sounds  No murmur heard  Extremities  No edema   Pulmonary/Chest: Effort normal and breath sounds normal    Abdominal: Soft  Bowel sounds are normal  She exhibits no distension and no mass  There is no tenderness  There is no rebound and no guarding  No hernia  Lymphadenopathy:     She has no cervical adenopathy  Neurological: She is alert and oriented to person, place, and time  No cranial nerve deficit  She exhibits normal muscle tone  Coordination normal    Gait normal   Skin: No rash noted  Psychiatric: She has a normal mood and affect  Her behavior is normal  Judgment and thought content normal    BMI Counseling: Body mass index is 31 47 kg/m²  The BMI is above normal  Nutrition recommendations include decreasing portion sizes, moderation in carbohydrate intake and reducing intake of saturated and trans fat

## 2020-10-01 ENCOUNTER — OCCMED (OUTPATIENT)
Dept: URGENT CARE | Age: 35
End: 2020-10-01
Payer: OTHER MISCELLANEOUS

## 2020-10-01 ENCOUNTER — APPOINTMENT (OUTPATIENT)
Dept: RADIOLOGY | Age: 35
End: 2020-10-01
Payer: OTHER MISCELLANEOUS

## 2020-10-01 DIAGNOSIS — T14.90XA INJURY: Primary | ICD-10-CM

## 2020-10-01 DIAGNOSIS — T14.90XA INJURY: ICD-10-CM

## 2020-10-01 PROCEDURE — 72100 X-RAY EXAM L-S SPINE 2/3 VWS: CPT

## 2020-10-01 PROCEDURE — 99283 EMERGENCY DEPT VISIT LOW MDM: CPT | Performed by: PHYSICIAN ASSISTANT

## 2020-10-01 PROCEDURE — G0382 LEV 3 HOSP TYPE B ED VISIT: HCPCS | Performed by: PHYSICIAN ASSISTANT

## 2020-10-05 ENCOUNTER — APPOINTMENT (OUTPATIENT)
Dept: URGENT CARE | Age: 35
End: 2020-10-05
Payer: OTHER MISCELLANEOUS

## 2020-10-05 PROCEDURE — 99213 OFFICE O/P EST LOW 20 MIN: CPT | Performed by: PHYSICIAN ASSISTANT

## 2020-10-12 ENCOUNTER — OCCMED (OUTPATIENT)
Dept: URGENT CARE | Age: 35
End: 2020-10-12
Payer: OTHER MISCELLANEOUS

## 2020-10-12 DIAGNOSIS — M54.41 ACUTE RIGHT-SIDED LOW BACK PAIN WITH RIGHT-SIDED SCIATICA: Primary | ICD-10-CM

## 2020-10-12 PROCEDURE — 99213 OFFICE O/P EST LOW 20 MIN: CPT | Performed by: FAMILY MEDICINE

## 2020-10-22 ENCOUNTER — APPOINTMENT (OUTPATIENT)
Dept: URGENT CARE | Age: 35
End: 2020-10-22
Payer: OTHER MISCELLANEOUS

## 2020-10-22 PROCEDURE — 99213 OFFICE O/P EST LOW 20 MIN: CPT | Performed by: PHYSICIAN ASSISTANT

## 2020-11-06 ENCOUNTER — OCCMED (OUTPATIENT)
Dept: URGENT CARE | Age: 35
End: 2020-11-06

## 2020-11-06 DIAGNOSIS — Z02.6 ENCOUNTER RELATED TO WORKER'S COMPENSATION CLAIM: Primary | ICD-10-CM

## 2020-12-28 DIAGNOSIS — F32.A CHRONIC DEPRESSION: ICD-10-CM

## 2020-12-28 DIAGNOSIS — F41.1 GENERALIZED ANXIETY DISORDER: Primary | ICD-10-CM

## 2020-12-28 RX ORDER — VENLAFAXINE HYDROCHLORIDE 150 MG/1
150 CAPSULE, EXTENDED RELEASE ORAL DAILY
Qty: 30 CAPSULE | Refills: 0 | Status: SHIPPED | OUTPATIENT
Start: 2020-12-28 | End: 2021-01-19

## 2021-01-11 ENCOUNTER — TELEPHONE (OUTPATIENT)
Dept: ADMINISTRATIVE | Facility: OTHER | Age: 36
End: 2021-01-11

## 2021-01-11 NOTE — TELEPHONE ENCOUNTER
----- Message from Laura Kovacs sent at 1/8/2021  1:03 PM EST -----  Regarding: HIV  01/08/21 1:03 PM    Sherif, our patient Darreld Dark has had HIV completed/performed  Please assist in updating the patient chart by pulling the Care Everywhere (CE) document  The date of service is 6/11/2020       Thank you,  Naseem Yu MA   [facility]

## 2021-01-11 NOTE — TELEPHONE ENCOUNTER
Upon review of the In Basket request we were able to locate, review, and update the patient chart as requested for HIV  Any additional questions or concerns should be emailed to the Practice Liaisons via [email or web address] email, please do not reply via In Basket      Thank you  Martínez Bonilla MA

## 2021-01-14 ENCOUNTER — OFFICE VISIT (OUTPATIENT)
Dept: FAMILY MEDICINE CLINIC | Facility: CLINIC | Age: 36
End: 2021-01-14
Payer: COMMERCIAL

## 2021-01-14 VITALS
HEART RATE: 130 BPM | WEIGHT: 206.4 LBS | HEIGHT: 67 IN | SYSTOLIC BLOOD PRESSURE: 132 MMHG | OXYGEN SATURATION: 99 % | TEMPERATURE: 96.7 F | BODY MASS INDEX: 32.39 KG/M2 | DIASTOLIC BLOOD PRESSURE: 67 MMHG

## 2021-01-14 DIAGNOSIS — F41.1 GENERALIZED ANXIETY DISORDER: Primary | ICD-10-CM

## 2021-01-14 DIAGNOSIS — Z20.822 EXPOSURE TO COVID-19 VIRUS: ICD-10-CM

## 2021-01-14 DIAGNOSIS — F32.A CHRONIC DEPRESSION: ICD-10-CM

## 2021-01-14 DIAGNOSIS — N92.6 MENSTRUAL PERIODS IRREGULAR: ICD-10-CM

## 2021-01-14 DIAGNOSIS — E78.2 MIXED HYPERLIPIDEMIA: ICD-10-CM

## 2021-01-14 DIAGNOSIS — E03.9 ACQUIRED HYPOTHYROIDISM: ICD-10-CM

## 2021-01-14 DIAGNOSIS — R00.0 INCREASED HEART RATE: ICD-10-CM

## 2021-01-14 DIAGNOSIS — R03.0 BLOOD PRESSURE ELEVATED WITHOUT HISTORY OF HTN: ICD-10-CM

## 2021-01-14 PROCEDURE — 99214 OFFICE O/P EST MOD 30 MIN: CPT | Performed by: FAMILY MEDICINE

## 2021-01-14 NOTE — PROGRESS NOTES
Assessment/Plan:       No problem-specific Assessment & Plan notes found for this encounter  Diagnoses and all orders for this visit:    Generalized anxiety disorder  Comments:  Controlled  To continue medication  Acquired hypothyroidism    Mixed hyperlipidemia  Comments: To follow with low-fat diet  Orders:  -     Lipid Panel with Direct LDL reflex; Future    Increased heart rate  Comments:  check EKG b pt declined  Advised patient if she feels palpitation chest pain dizziness to go to the ER  Orders:  -     CBC and differential; Future  -     TSH, 3rd generation with Free T4 reflex; Future    Blood pressure elevated without history of HTN  Comments: To watch salt intake and to lose weight  Will check blood pressure with next office visit  Questionable BS is slightly abnormal secondary to birth control  Chronic depression  Comments:  doing well    Exposure to COVID-19 virus  Comments:  Discussed COVID testing  Pt  would like to have her test done but she is going to do it at the [facility] pharmacy free of charge, to call if she develops symptoms    Menstrual periods irregular  Comments:  Since patient on medication recommend to check pregnancy test make sure she is not pregnant  refused pregnancy test  she is following with gyn        Patient Instructions   To follow with test results      Orders Placed This Encounter   Procedures    Lipid Panel with Direct LDL reflex     This is a patient instruction: This test requires patient fasting for 10-12 hours or longer  Drinking of black coffee or black tea is acceptable  Standing Status:   Future     Standing Expiration Date:   1/14/2022    CBC and differential     This is a patient instruction: This test is non-fasting  Please drink two glasses of water morning of bloodwork          Standing Status:   Future     Standing Expiration Date:   1/14/2022    TSH, 3rd generation with Free T4 reflex     Standing Status:   Future     Standing Expiration Date:   1/14/2022         Subjective:     Patient ID: Annabelle Purvis is a 30 y o  female      HPI   Hypothyroid  Patient did gain weight  She believes she gain weight because she is not working , due to [address] cold intolerance or fatigue  Anxiety patient stated her anxiety is controlled she takes rarely have Klonopin occasionally  Depression  Denied being depressed  D with well with the medication  Irregular menstrual   Patient admit to irregular menstrual   She did see gyn doctor Amber Bahena recently  Patient is sexually active  Heart rate is elevated at this office visit  Patient denied palpitation chest pain or dizziness  Patient was exposed to her work for it  Last Tuesday  Patient had mask   Loss and the ground patient had COVID test before she was exposed it was negative  Patient denied cough fever chills nasal congestion myalgia abdominal pain diarrhea      Review of Systems   Constitutional: Negative for chills, diaphoresis and fatigue  HENT: Negative for ear pain, sore throat, trouble swallowing and voice change  Eyes: Negative for visual disturbance  Respiratory: Negative for cough, chest tightness and shortness of breath  Cardiovascular: Negative for chest pain, palpitations and leg swelling  Gastrointestinal: Negative for abdominal pain, blood in stool, constipation, diarrhea and nausea  Endocrine: Negative for polydipsia and polyuria  Genitourinary: Negative for dysuria, flank pain, frequency, hematuria, pelvic pain, urgency, vaginal bleeding and vaginal discharge  Musculoskeletal: Negative for arthralgias, back pain, gait problem, myalgias and neck pain  Skin: Negative for rash  Allergic/Immunologic: Negative for food allergies  Neurological: Negative for dizziness, tremors, seizures, weakness, light-headedness, numbness and headaches  Hematological: Negative for adenopathy  Does not bruise/bleed easily     Psychiatric/Behavioral: Negative for confusion and dysphoric mood  The patient is not nervous/anxious  Objective:     Physical Exam  Constitutional:       General: She is not in acute distress  Appearance: Normal appearance  She is well-developed  She is not ill-appearing, toxic-appearing or diaphoretic  HENT:      Head: Normocephalic and atraumatic  Eyes:      General: No scleral icterus  Conjunctiva/sclera: Conjunctivae normal       Pupils: Pupils are equal, round, and reactive to light  Neck:      Musculoskeletal: Neck supple  Thyroid: No thyromegaly  Vascular: No carotid bruit or JVD  Cardiovascular:      Rate and Rhythm: Normal rate and regular rhythm  Heart sounds: Normal heart sounds  No murmur  Comments: Extremities  No edema  Pulmonary:      Effort: Pulmonary effort is normal       Breath sounds: Normal breath sounds  Abdominal:      General: There is no distension  Palpations: Abdomen is soft  There is no mass  Tenderness: There is no abdominal tenderness  There is no guarding or rebound  Hernia: No hernia is present  Musculoskeletal:      Right lower leg: No edema  Left lower leg: No edema  Lymphadenopathy:      Cervical: No cervical adenopathy  Skin:     Coloration: Skin is not jaundiced or pale  Findings: No rash  Neurological:      Mental Status: She is alert and oriented to person, place, and time  Cranial Nerves: No cranial nerve deficit  Motor: No weakness or abnormal muscle tone  Coordination: Coordination normal       Gait: Gait normal    Psychiatric:         Mood and Affect: Mood normal          Behavior: Behavior normal          Thought Content: Thought content normal          Judgment: Judgment normal      BMI Counseling: Body mass index is 32 33 kg/m²  The BMI is above normal  Nutrition recommendations include decreasing portion sizes

## 2021-01-19 DIAGNOSIS — F32.A CHRONIC DEPRESSION: ICD-10-CM

## 2021-01-19 DIAGNOSIS — F41.1 GENERALIZED ANXIETY DISORDER: ICD-10-CM

## 2021-01-19 RX ORDER — VENLAFAXINE HYDROCHLORIDE 150 MG/1
CAPSULE, EXTENDED RELEASE ORAL
Qty: 30 CAPSULE | Refills: 2 | Status: SHIPPED | OUTPATIENT
Start: 2021-01-19 | End: 2021-02-25

## 2021-02-14 DIAGNOSIS — E03.9 ACQUIRED HYPOTHYROIDISM: ICD-10-CM

## 2021-02-14 RX ORDER — LEVOTHYROXINE SODIUM 0.03 MG/1
TABLET ORAL
Qty: 90 TABLET | Refills: 3 | Status: SHIPPED | OUTPATIENT
Start: 2021-02-14 | End: 2022-07-15 | Stop reason: SDUPTHER

## 2021-02-25 DIAGNOSIS — F41.1 GENERALIZED ANXIETY DISORDER: ICD-10-CM

## 2021-02-25 DIAGNOSIS — F32.A CHRONIC DEPRESSION: ICD-10-CM

## 2021-02-25 RX ORDER — VENLAFAXINE HYDROCHLORIDE 150 MG/1
CAPSULE, EXTENDED RELEASE ORAL
Qty: 90 CAPSULE | Refills: 1 | Status: SHIPPED | OUTPATIENT
Start: 2021-02-25 | End: 2021-08-02

## 2021-03-12 ENCOUNTER — TELEPHONE (OUTPATIENT)
Dept: FAMILY MEDICINE CLINIC | Facility: CLINIC | Age: 36
End: 2021-03-12

## 2021-03-12 ENCOUNTER — APPOINTMENT (EMERGENCY)
Dept: RADIOLOGY | Facility: HOSPITAL | Age: 36
End: 2021-03-12
Payer: COMMERCIAL

## 2021-03-12 ENCOUNTER — HOSPITAL ENCOUNTER (EMERGENCY)
Facility: HOSPITAL | Age: 36
Discharge: HOME | End: 2021-03-12
Attending: EMERGENCY MEDICINE
Payer: COMMERCIAL

## 2021-03-12 VITALS
RESPIRATION RATE: 18 BRPM | BODY MASS INDEX: 32.33 KG/M2 | OXYGEN SATURATION: 98 % | SYSTOLIC BLOOD PRESSURE: 120 MMHG | HEIGHT: 67 IN | DIASTOLIC BLOOD PRESSURE: 76 MMHG | HEART RATE: 108 BPM | WEIGHT: 206 LBS | TEMPERATURE: 97.6 F

## 2021-03-12 DIAGNOSIS — R00.2 PALPITATIONS: Primary | ICD-10-CM

## 2021-03-12 LAB
ANION GAP SERPL CALCULATED.3IONS-SCNC: 4 MMOL/L (ref 4–13)
ATRIAL RATE: 109 BPM
BASOPHILS # BLD AUTO: 0.04 THOUSANDS/ΜL (ref 0–0.1)
BASOPHILS NFR BLD AUTO: 1 % (ref 0–1)
BUN SERPL-MCNC: 14 MG/DL (ref 5–25)
CALCIUM SERPL-MCNC: 9.8 MG/DL (ref 8.3–10.1)
CHLORIDE SERPL-SCNC: 110 MMOL/L (ref 100–108)
CO2 SERPL-SCNC: 28 MMOL/L (ref 21–32)
CREAT SERPL-MCNC: 1.03 MG/DL (ref 0.6–1.3)
EOSINOPHIL # BLD AUTO: 0.04 THOUSAND/ΜL (ref 0–0.61)
EOSINOPHIL NFR BLD AUTO: 1 % (ref 0–6)
ERYTHROCYTE [DISTWIDTH] IN BLOOD BY AUTOMATED COUNT: 13 % (ref 11.6–15.1)
GFR SERPL CREATININE-BSD FRML MDRD: 71 ML/MIN/1.73SQ M
GLUCOSE SERPL-MCNC: 87 MG/DL (ref 65–140)
HCG SERPL QL: NEGATIVE
HCT VFR BLD AUTO: 43.3 % (ref 34.8–46.1)
HGB BLD-MCNC: 13.8 G/DL (ref 11.5–15.4)
IMM GRANULOCYTES # BLD AUTO: 0.01 THOUSAND/UL (ref 0–0.2)
IMM GRANULOCYTES NFR BLD AUTO: 0 % (ref 0–2)
LYMPHOCYTES # BLD AUTO: 1.63 THOUSANDS/ΜL (ref 0.6–4.47)
LYMPHOCYTES NFR BLD AUTO: 30 % (ref 14–44)
MAGNESIUM SERPL-MCNC: 2.2 MG/DL (ref 1.6–2.6)
MCH RBC QN AUTO: 30.3 PG (ref 26.8–34.3)
MCHC RBC AUTO-ENTMCNC: 31.9 G/DL (ref 31.4–37.4)
MCV RBC AUTO: 95 FL (ref 82–98)
MONOCYTES # BLD AUTO: 0.56 THOUSAND/ΜL (ref 0.17–1.22)
MONOCYTES NFR BLD AUTO: 10 % (ref 4–12)
NEUTROPHILS # BLD AUTO: 3.15 THOUSANDS/ΜL (ref 1.85–7.62)
NEUTS SEG NFR BLD AUTO: 58 % (ref 43–75)
NRBC BLD AUTO-RTO: 0 /100 WBCS
P AXIS: 79 DEGREES
PHOSPHATE SERPL-MCNC: 2.8 MG/DL (ref 2.7–4.5)
PLATELET # BLD AUTO: 296 THOUSANDS/UL (ref 149–390)
PMV BLD AUTO: 9.7 FL (ref 8.9–12.7)
POTASSIUM SERPL-SCNC: 4.4 MMOL/L (ref 3.5–5.3)
PR INTERVAL: 148 MS
QRS AXIS: 89 DEGREES
QRSD INTERVAL: 76 MS
QT INTERVAL: 302 MS
QTC INTERVAL: 406 MS
RBC # BLD AUTO: 4.55 MILLION/UL (ref 3.81–5.12)
SODIUM SERPL-SCNC: 142 MMOL/L (ref 136–145)
T WAVE AXIS: 49 DEGREES
TSH SERPL DL<=0.05 MIU/L-ACNC: 2.06 UIU/ML (ref 0.36–3.74)
VENTRICULAR RATE: 109 BPM
WBC # BLD AUTO: 5.43 THOUSAND/UL (ref 4.31–10.16)

## 2021-03-12 PROCEDURE — 84443 ASSAY THYROID STIM HORMONE: CPT | Performed by: EMERGENCY MEDICINE

## 2021-03-12 PROCEDURE — 85025 COMPLETE CBC W/AUTO DIFF WBC: CPT | Performed by: EMERGENCY MEDICINE

## 2021-03-12 PROCEDURE — 36415 COLL VENOUS BLD VENIPUNCTURE: CPT | Performed by: EMERGENCY MEDICINE

## 2021-03-12 PROCEDURE — 84703 CHORIONIC GONADOTROPIN ASSAY: CPT | Performed by: EMERGENCY MEDICINE

## 2021-03-12 PROCEDURE — 84100 ASSAY OF PHOSPHORUS: CPT | Performed by: EMERGENCY MEDICINE

## 2021-03-12 PROCEDURE — 99285 EMERGENCY DEPT VISIT HI MDM: CPT

## 2021-03-12 PROCEDURE — 93010 ELECTROCARDIOGRAM REPORT: CPT | Performed by: INTERNAL MEDICINE

## 2021-03-12 PROCEDURE — 93005 ELECTROCARDIOGRAM TRACING: CPT

## 2021-03-12 PROCEDURE — 71046 X-RAY EXAM CHEST 2 VIEWS: CPT

## 2021-03-12 PROCEDURE — 99285 EMERGENCY DEPT VISIT HI MDM: CPT | Performed by: EMERGENCY MEDICINE

## 2021-03-12 PROCEDURE — 80048 BASIC METABOLIC PNL TOTAL CA: CPT | Performed by: EMERGENCY MEDICINE

## 2021-03-12 PROCEDURE — 83735 ASSAY OF MAGNESIUM: CPT | Performed by: EMERGENCY MEDICINE

## 2021-03-12 NOTE — ED PROVIDER NOTES
History  Chief Complaint   Patient presents with    Palpitations     pt was at work when she bent over and started to feel her heart race, she placed a pulse ox on and her heart rate was 180-210's this started apx 1100  c/o palpitations that are getting better      29-year-old female with history of anxiety, depression and hypothyroidism presents to the emergency department for evaluation of palpitations  The patient reports that approximately an hour and half before presentation she bent over to  a pen and immediately felt that her heart was racing  She put a pulse ox on her finger and states that her heart rate was elevated over 200  She reports associated her feeling of shortness of breath during this episode  States that it lasted approximately 30 minutes and then self resolved  She was able to drive herself here to the emergency department  Patient currently denies feeling palpitations or short of breath but endorses feeling fatigued  Prior to this episode she reports that she was in her usual state of health  She has had prior episodes where she felt her heart was racing but states her most recent episode was most likely over a year ago  The patient's mother has history of mitral valve prolapse as well as cardiac ablations  She states that she has been compliant with her medication and denies fevers, chills, nausea, vomiting, diarrhea, sick contacts and recent travel  Prior to Admission Medications   Prescriptions Last Dose Informant Patient Reported? Taking?    BLISOVI FE 1/20 1-20 MG-MCG per tablet Past Week at Unknown time Self No Yes   Sig: TAKE 1 TABLET DAILY   Black Elderberry,Berry-Flower, 575 MG CAPS Past Week at Unknown time Self Yes Yes   Sig: Take by mouth   Olive Leaf Extract 150 MG CAPS Past Week at Unknown time Self Yes Yes   Sig: Take 3 capsules by mouth daily   clonazePAM (KlonoPIN) 1 mg tablet Past Week at Unknown time Self No Yes   Sig: Take 0 5 tablets (0 5 mg total) by mouth 2 (two) times a day as needed for anxiety   fexofenadine (ALLEGRA) 180 MG tablet Past Week at Unknown time Self Yes Yes   Sig: Take 1 tablet by mouth daily as needed   fluticasone (FLONASE) 50 mcg/act nasal spray Past Week at Unknown time Self Yes Yes   Si sprays into each nostril daily   levothyroxine 25 mcg tablet Past Week at Unknown time  No Yes   Sig: TAKE 1 TABLET DAILY EARLY IN THE MORNING   multivitamin (THERAGRAN) TABS Past Week at Unknown time Self Yes Yes   Sig: Take 1 tablet by mouth daily   venlafaxine (EFFEXOR-XR) 150 mg 24 hr capsule Past Week at Unknown time  No Yes   Sig: TAKE 1 CAPSULE DAILY      Facility-Administered Medications: None       Past Medical History:   Diagnosis Date    Acquired hypothyroidism 2019    Anxiety     Depression     last assessed 10/20/17       History reviewed  No pertinent surgical history  Family History   Problem Relation Age of Onset    No Known Problems Family      I have reviewed and agree with the history as documented  E-Cigarette/Vaping    E-Cigarette Use Never User      E-Cigarette/Vaping Substances    Nicotine No     THC No     CBD No     Flavoring No      Social History     Tobacco Use    Smoking status: Never Smoker    Smokeless tobacco: Never Used   Substance Use Topics    Alcohol use: Yes     Comment: rarely    Drug use: Never        Review of Systems   Constitutional: Negative for chills and fever  HENT: Negative for ear pain and sore throat  Eyes: Negative for pain and visual disturbance  Respiratory: Positive for shortness of breath  Negative for cough  Cardiovascular: Positive for palpitations  Negative for chest pain  Gastrointestinal: Negative for abdominal pain and vomiting  Genitourinary: Negative for dysuria and hematuria  Musculoskeletal: Negative for arthralgias and back pain  Skin: Negative for color change and rash  Neurological: Negative for seizures and syncope     All other systems reviewed and are negative  Physical Exam  ED Triage Vitals [03/12/21 1241]   Temperature Pulse Respirations Blood Pressure SpO2   97 6 °F (36 4 °C) (!) 121 18 127/76 98 %      Temp Source Heart Rate Source Patient Position - Orthostatic VS BP Location FiO2 (%)   Tympanic Monitor Lying Right arm --      Pain Score       --             Orthostatic Vital Signs  Vitals:    03/12/21 1241 03/12/21 1245 03/12/21 1300   BP: 127/76 127/76 120/76   Pulse: (!) 121 (!) 112 (!) 108   Patient Position - Orthostatic VS: Lying         Physical Exam  Vitals signs and nursing note reviewed  Constitutional:       General: She is not in acute distress  Appearance: She is well-developed  HENT:      Head: Normocephalic and atraumatic  Eyes:      Conjunctiva/sclera: Conjunctivae normal    Neck:      Musculoskeletal: Neck supple  Cardiovascular:      Rate and Rhythm: Regular rhythm  Tachycardia present  Heart sounds: No murmur  Pulmonary:      Effort: Pulmonary effort is normal  No respiratory distress  Breath sounds: Normal breath sounds  Abdominal:      Palpations: Abdomen is soft  Tenderness: There is no abdominal tenderness  Skin:     General: Skin is warm and dry  Neurological:      Mental Status: She is alert  GCS: GCS eye subscore is 4  GCS verbal subscore is 5  GCS motor subscore is 6  Sensory: Sensation is intact  Motor: Motor function is intact           ED Medications  Medications - No data to display    Diagnostic Studies  Results Reviewed     Procedure Component Value Units Date/Time    Basic metabolic panel [[id number]]  (Abnormal) Collected: 03/12/21 1302    Lab Status: Final result Specimen: Blood from Arm, Left Updated: 03/12/21 1344     Sodium 142 mmol/L      Potassium 4 4 mmol/L      Chloride 110 mmol/L      CO2 28 mmol/L      ANION GAP 4 mmol/L      BUN 14 mg/dL      Creatinine 1 03 mg/dL      Glucose 87 mg/dL      Calcium 9 8 mg/dL      eGFR 71 ml/min/1 73sq m Narrative:      National Kidney Disease Foundation guidelines for Chronic Kidney Disease (CKD):     Stage 1 with normal or high GFR (GFR > 90 mL/min/1 73 square meters)    Stage 2 Mild CKD (GFR = 60-89 mL/min/1 73 square meters)    Stage 3A Moderate CKD (GFR = 45-59 mL/min/1 73 square meters)    Stage 3B Moderate CKD (GFR = 30-44 mL/min/1 73 square meters)    Stage 4 Severe CKD (GFR = 15-29 mL/min/1 73 square meters)    Stage 5 End Stage CKD (GFR <15 mL/min/1 73 square meters)  Note: GFR calculation is accurate only with a steady state creatinine    Phosphorus [[id number]]  (Normal) Collected: 03/12/21 1302    Lab Status: Final result Specimen: Blood from Arm, Left Updated: 03/12/21 1344     Phosphorus 2 8 mg/dL     TSH, 3rd generation with Free T4 reflex [[id number]]  (Normal) Collected: 03/12/21 1302    Lab Status: Final result Specimen: Blood from Arm, Left Updated: 03/12/21 1344     TSH 3RD GENERATON 2 060 uIU/mL     Narrative:      Patients undergoing fluorescein dye angiography may retain small amounts of fluorescein in the body for 48-72 hours post procedure  Samples containing fluorescein can produce falsely depressed TSH values  If the patient had this procedure,a specimen should be resubmitted post fluorescein clearance        hCG, qualitative pregnancy [[id number]]  (Normal) Collected: 03/12/21 1302    Lab Status: Final result Specimen: Blood from Arm, Left Updated: 03/12/21 1344     Preg, Serum Negative    Magnesium [[id number]]  (Normal) Collected: 03/12/21 1302    Lab Status: Final result Specimen: Blood from Arm, Left Updated: 03/12/21 1344     Magnesium 2 2 mg/dL     CBC and differential [[id number]] Collected: 03/12/21 1302    Lab Status: Final result Specimen: Blood from Arm, Left Updated: 03/12/21 1315     WBC 5 43 Thousand/uL      RBC 4 55 Million/uL      Hemoglobin 13 8 g/dL      Hematocrit 43 3 %      MCV 95 fL      MCH 30 3 pg      MCHC 31 9 g/dL      RDW 13 0 %      MPV 9 7 fL      Platelets 296 Thousands/uL      nRBC 0 /100 WBCs      Neutrophils Relative 58 %      Immat GRANS % 0 %      Lymphocytes Relative 30 %      Monocytes Relative 10 %      Eosinophils Relative 1 %      Basophils Relative 1 %      Neutrophils Absolute 3 15 Thousands/µL      Immature Grans Absolute 0 01 Thousand/uL      Lymphocytes Absolute 1 63 Thousands/µL      Monocytes Absolute 0 56 Thousand/µL      Eosinophils Absolute 0 04 Thousand/µL      Basophils Absolute 0 04 Thousands/µL                  XR chest 2 views   ED Interpretation by Tanya Camacho MD (03/12 7925)   No acute cardiopulmonary process  Final Result by Obdulia Rojas DO (03/12 7697)   No acute cardiopulmonary disease  Workstation performed: [id number]            Procedures  ECG 12 Lead Documentation Only    Date/Time: 3/12/2021 1:09 PM  Performed by: Tanya Camacho MD  Authorized by: Tanya Camacho MD     ECG reviewed by me, the ED Provider: yes    Patient location:  ED  Previous ECG:     Previous ECG:  Compared to current    Similarity:  No change    Comparison to cardiac monitor: Yes    Interpretation:     Interpretation: normal    Rate:     ECG rate assessment: tachycardic    Rhythm:     Rhythm: sinus tachycardia    Ectopy:     Ectopy: none    QRS:     QRS axis:  Normal  Conduction:     Conduction: normal    ST segments:     ST segments:  Normal  T waves:     T waves: normal            ED Course                                       MDM  Number of Diagnoses or Management Options  Palpitations:   Diagnosis management comments: 61-year-old female presented to the emergency department for evaluation of palpitations  On arrival the patient was awake, alert, oriented and in no acute distress  Initial vital signs show that the patient was tachycardic but otherwise her vital signs were within normal limits  Physical exam and workup done in the emergency department without acute findings  EKG showing sinus tachycardia    All diagnostic studies discussed with the patient in detail with recommendation to follow up with Cardiology for continued symptoms  Return precautions were discussed  Patient agrees with the plan for discharge and feels comfortable to go home with proper f/u  Advised to return for worsening or additional problems  Diagnostic tests were reviewed and questions answered  Diagnosis, care plan and treatment options were discussed  The patient understands instructions and will follow up as directed  Disposition  Final diagnoses:   Palpitations     Time reflects when diagnosis was documented in both MDM as applicable and the Disposition within this note     Time User Action Codes Description Comment    3/12/2021  1:50 PM Mini Given Add [R00 2] Palpitations       ED Disposition     ED Disposition Condition Date/Time Comment    Discharge Stable Fri Mar 12, 2021  1:50 PM Erum Membreno discharge to home/self care              Follow-up Information     Follow up With Specialties Details Why Contact Info Additional Information    Richard Alaniz DO Cardiology Schedule an appointment as soon as possible for a visit   [address]       Patricia Greer MD Family Medicine Go to  As needed [facility] [address]  [phone]       [address] [place] [address] Emergency Department Emergency Medicine Go to  If symptoms worsen [address] [place] [address] Emergency Department, [address], [place], [id number] [phone]          Discharge Medication List as of 3/12/2021  1:54 PM      CONTINUE these medications which have NOT CHANGED    Details   Black Elderberry,Berry-Flower, 575 MG CAPS Take by mouth, Historical Med      BLISOVI FE 1/20 1-20 MG-MCG per tablet TAKE 1 TABLET DAILY, Normal      clonazePAM (KlonoPIN) 1 mg tablet Take 0 5 tablets (0 5 mg total) by mouth 2 (two) times a day as needed for anxiety, Starting Tue 7/7/2020, Normal      fexofenadine (ALLEGRA) 180 MG tablet Take 1 tablet by mouth daily as needed, Starting Tue 1/9/2018, Historical Med      fluticasone (FLONASE) 50 mcg/act nasal spray 2 sprays into each nostril daily, Starting Tue 1/9/2018, Historical Med      levothyroxine 25 mcg tablet TAKE 1 TABLET DAILY EARLY IN THE MORNING, Normal      multivitamin (THERAGRAN) TABS Take 1 tablet by mouth daily, Historical Med      Olive Leaf Extract 150 MG CAPS Take 3 capsules by mouth daily, Historical Med      venlafaxine (EFFEXOR-XR) 150 mg 24 hr capsule TAKE 1 CAPSULE DAILY, Normal           No discharge procedures on file  PDMP Review       Value Time User    PDMP Reviewed  Yes 7/7/2020  9:48 AM Gaston Burgess MD           ED Provider  Attending physically available and evaluated Erum Membreno I managed the patient along with the ED Attending      Electronically Signed by         Sergio Isidro MD  03/12/21 1379

## 2021-03-12 NOTE — ED ATTENDING ATTESTATION
3/12/2021  IViviane MD, saw and evaluated the patient  I have discussed the patient with the resident/non-physician practitioner and agree with the resident's/non-physician practitioner's findings, Plan of Care, and MDM as documented in the resident's/non-physician practitioner's note, except where noted  All available labs and Radiology studies were reviewed  I was present for key portions of any procedure(s) performed by the resident/non-physician practitioner and I was immediately available to provide assistance  At this point I agree with the current assessment done in the Emergency Department  I have conducted an independent evaluation of this patient a history and physical is as follows:    ED Course         Critical Care Time  Procedures    31 yo female with hx of anxiety, depression, hypothryoid, started at 11 am this morning bent over to  a pen and felt heart racing with hr of 200  Pt with hx of same but never captured  Pt mother with hx of mvp, ablations  Pt with chest tightness, sob with episode, lasted 30 minutes and now resolved  Vss, afebrile, tachy, lungs cta, rrr, abdomen soft nontender, no neuro deficits  Ekg, labs, tsh, urine preg  Synthroid filled until follow up appointment in February.  Cherelle Taylor LPN

## 2021-03-12 NOTE — TELEPHONE ENCOUNTER
Patient called she is having rapid beat for like half hour her pulse went up to 240  This is a genetic family trait, she states that you know about this  I spoke to Dr José Lee and she advised for patient to go to the emergency room to be evaluated and then she would place an order for a cardiologist for the patient

## 2021-03-25 DIAGNOSIS — F41.1 GENERALIZED ANXIETY DISORDER: ICD-10-CM

## 2021-03-25 RX ORDER — CLONAZEPAM 1 MG/1
0.5 TABLET ORAL 2 TIMES DAILY PRN
Qty: 30 TABLET | Refills: 0 | Status: SHIPPED | OUTPATIENT
Start: 2021-03-25 | End: 2022-05-10 | Stop reason: SDUPTHER

## 2021-03-25 NOTE — TELEPHONE ENCOUNTER
Medication: Clonazepam  Pharmacy: [facility]  Last refill: 7/7/2020  Last office visit: 1/14/2021  Upcoming office visit: 7/15/2021

## 2021-03-26 DIAGNOSIS — Z23 ENCOUNTER FOR IMMUNIZATION: ICD-10-CM

## 2021-04-05 ENCOUNTER — OFFICE VISIT (OUTPATIENT)
Dept: CARDIOLOGY CLINIC | Facility: CLINIC | Age: 36
End: 2021-04-05
Payer: COMMERCIAL

## 2021-04-05 VITALS
HEIGHT: 66 IN | BODY MASS INDEX: 32.06 KG/M2 | HEART RATE: 101 BPM | WEIGHT: 199.5 LBS | DIASTOLIC BLOOD PRESSURE: 76 MMHG | SYSTOLIC BLOOD PRESSURE: 122 MMHG

## 2021-04-05 DIAGNOSIS — R00.2 PALPITATIONS: Primary | ICD-10-CM

## 2021-04-05 DIAGNOSIS — F41.1 GENERALIZED ANXIETY DISORDER: ICD-10-CM

## 2021-04-05 DIAGNOSIS — E03.9 ACQUIRED HYPOTHYROIDISM: ICD-10-CM

## 2021-04-05 PROCEDURE — 99244 OFF/OP CNSLTJ NEW/EST MOD 40: CPT | Performed by: INTERNAL MEDICINE

## 2021-04-05 PROCEDURE — 93000 ELECTROCARDIOGRAM COMPLETE: CPT | Performed by: INTERNAL MEDICINE

## 2021-04-05 RX ORDER — FLECAINIDE ACETATE 100 MG/1
100 TABLET ORAL AS NEEDED
Qty: 30 TABLET | Refills: 3 | Status: SHIPPED | OUTPATIENT
Start: 2021-04-05

## 2021-04-05 NOTE — LETTER
April 5, 2021     MD Geronimo Dobson  [address] [place] [address]    Patient: William Membreno   YOB: 1985   Date of Visit: 4/5/2021       Dear Dr Shreyas Jones: Thank you for referring William Membreno to me for evaluation  Below are my notes for this consultation  If you have questions, please do not hesitate to call me  I look forward to following your patient along with you  Sincerely,        Isis Ceron MD        CC: Ashely Plata MD  4/5/2021  1:49 PM  Sign when Signing Visit                                             Cardiology Consultation     William Membreno  [id number]  1985  HEART & [facility]  [address] [address]    1  Palpitations  POCT ECG   2  Acquired hypothyroidism     3   Generalized anxiety disorder         History of present illness     patient has been referred evaluation of  Palpitation and tachycardia   According to her was recently in the ER for palpitation    patient has significant medical history of   palpitation  hypothyroidism   anxiety disorder     patient has a long history of elevated baseline heart rate   she can recall from the time she was in high school     she also gives a history of palpitation   Episodes are usually brief, terminated by Valsalva  Current episode was long, more than 40 minutes, went to the ER   however it terminated before she was evaluated  Heart rate was noted to be 200 in a pulse oximeter but was down to 100 by the time she was seen     no anginal like chest pain or pressure  No worsening orthopnea or PND   No leg swelling       episode of palpitation as described   Sudden onset and sudden termination  Prior response to Valsalva maneuver   Episodes are rare and would be happening maybe once in a year   associated with some shortness of breath and lightheadedness  No history of syncope   Some orthostatic lightheadedness     No exertional intolerance  Patient works as a  in more of in college   She is on her feet throughout the day  She approximate up to [phone] steps in a day     patient does not smoke   She does not abuse alcohol   She does not use recreational drugs   She does not use energy drinks     mother has a history of prior ablation      /76 (BP Location: Left arm, Patient Position: Sitting)   Pulse 101   Ht 5' 6" (1 676 m)   Wt 90 5 kg (199 lb 8 oz)   BMI 32 20 kg/m²     Patient Active Problem List   Diagnosis    Generalized anxiety disorder    Herpes zoster without complication    Acquired hypothyroidism    Palpitations     Past Medical History:   Diagnosis Date    Acquired hypothyroidism 12/30/2019    Anxiety     Depression     last assessed 10/20/17     Social History     Socioeconomic History    Marital status: Single     Spouse name: Not on file    Number of children: Not on file    Years of education: Not on file    Highest education level: Not on file   Occupational History    Occupation: currently works full time   Social Needs    Financial resource strain: Not on file    Food insecurity     Worry: Not on file     Inability: Not on file   Impulsiv needs     Medical: Not on file     Non-medical: Not on file   Tobacco Use    Smoking status: Never Smoker    Smokeless tobacco: Never Used   Substance and Sexual Activity    Alcohol use: Yes     Comment: rarely    Drug use: Never    Sexual activity: Not Currently     Birth control/protection: OCP   Lifestyle    Physical activity     Days per week: Not on file     Minutes per session: Not on file    Stress: Not on file   Relationships    Social connections     Talks on phone: Not on file     Gets together: Not on file     Attends Anabaptist service: Not on file     Active member of club or organization: Not on file     Attends meetings of clubs or organizations: Not on file     Relationship status: Not on file    Intimate partner violence     Fear of current or ex partner: Not on file     Emotionally abused: Not on file     Physically abused: Not on file     Forced sexual activity: Not on file   Other Topics Concern    Not on file   Social History Narrative    Lives with parents      Family History   Problem Relation Age of Onset    No Known Problems Family      History reviewed  No pertinent surgical history      Current Outpatient Medications:     Black Elderberry,Berry-Flower, 575 MG CAPS, Take by mouth, Disp: , Rfl:     BLISOVI FE 1/20 1-20 MG-MCG per tablet, TAKE 1 TABLET DAILY, Disp: 84 tablet, Rfl: 0    clonazePAM (KlonoPIN) 1 mg tablet, Take 0 5 tablets (0 5 mg total) by mouth 2 (two) times a day as needed for anxiety, Disp: 30 tablet, Rfl: 0    fexofenadine (ALLEGRA) 180 MG tablet, Take 1 tablet by mouth daily as needed, Disp: , Rfl:     fluticasone (FLONASE) 50 mcg/act nasal spray, 2 sprays into each nostril daily, Disp: , Rfl:     levothyroxine 25 mcg tablet, TAKE 1 TABLET DAILY EARLY IN THE MORNING, Disp: 90 tablet, Rfl: 3    multivitamin (THERAGRAN) TABS, Take 1 tablet by mouth daily, Disp: , Rfl:     Olive Leaf Extract 150 MG CAPS, Take 3 capsules by mouth daily, Disp: , Rfl:     venlafaxine (EFFEXOR-XR) 150 mg 24 hr capsule, TAKE 1 CAPSULE DAILY, Disp: 90 capsule, Rfl: 1  Allergies   Allergen Reactions    Cephalexin Fever     Vitals:    04/05/21 1301   BP: 122/76   BP Location: Left arm   Patient Position: Sitting   Pulse: 101   Weight: 90 5 kg (199 lb 8 oz)   Height: 5' 6" (1 676 m)       Labs:  Lab Results   Component Value Date    K 4 4 03/12/2021     (H) 03/12/2021    CO2 28 03/12/2021    BUN 14 03/12/2021    CREATININE 1 03 03/12/2021    CALCIUM 9 8 03/12/2021     No results found for: CKTOTAL, CKMB, CKMBINDEX, TROPONINI  Lab Results   Component Value Date    WBC 5 43 03/12/2021    HGB 13 8 03/12/2021    HCT 43 3 03/12/2021    MCV 95 03/12/2021     03/12/2021     Lab Results   Component Value Date TRIG 152 (H) 07/03/2020    HDL 59 07/03/2020     Imaging:     Xr Chest 2 Views  Result Date: 3/12/2021  Narrative: CHEST INDICATION:   palpitations  COMPARISON:  None EXAM PERFORMED/VIEWS:  XR CHEST PA & LATERAL  The frontal view was performed utilizing dual energy radiographic technique  Images: 4 FINDINGS: Cardiomediastinal silhouette appears unremarkable  The lungs are clear  No pneumothorax or pleural effusion  Osseous structures appear within normal limits for patient age  Impression: No acute cardiopulmonary disease  Workstation performed: [id number]      Review of Systems:  Review of Systems   All other systems reviewed and are negative  as described in my history of present illness    Physical Exam:  Physical Exam  Constitutional:       Appearance: Normal appearance  She is well-developed  Comments: Not in any distress at the current time   HENT:      Head: Normocephalic and atraumatic  Right Ear: External ear normal       Left Ear: External ear normal       Nose: Nose normal       Mouth/Throat:      Pharynx: Uvula midline  Comments: Thick neck  Eyes:      General: Lids are normal  No scleral icterus  Extraocular Movements: Extraocular movements intact  Conjunctiva/sclera: Conjunctivae normal       Pupils: Pupils are equal, round, and reactive to light  Comments: No pallor  No cyanosis  No icterus   Neck:      Musculoskeletal: Normal range of motion and neck supple  No neck rigidity or muscular tenderness  Thyroid: No thyromegaly  Vascular: No carotid bruit or JVD  Trachea: Trachea normal       Comments: No jugular lymphadenopathy  Cardiovascular:      Rate and Rhythm: Normal rate and regular rhythm  Chest Wall: PMI is not displaced  Pulses: Normal pulses  Heart sounds: Normal heart sounds, S1 normal and S2 normal  No murmur  No friction rub  No gallop  No S3 or S4 sounds      Pulmonary:      Effort: Pulmonary effort is normal  No accessory muscle usage or respiratory distress  Breath sounds: Normal breath sounds  No decreased breath sounds, wheezing, rhonchi or rales  Chest:      Chest wall: No tenderness  Abdominal:      General: Abdomen is flat  Bowel sounds are normal  There is no distension  Palpations: Abdomen is soft  There is no hepatomegaly, splenomegaly or mass  Tenderness: There is no abdominal tenderness  Musculoskeletal: Normal range of motion  General: No swelling, tenderness, deformity or signs of injury  Lymphadenopathy:      Cervical: No cervical adenopathy  Skin:     Coloration: Skin is not jaundiced or pale  Findings: No abrasion, bruising, erythema, lesion or rash  Nails: There is no clubbing  Neurological:      General: No focal deficit present  Mental Status: She is alert and oriented to person, place, and time  Mental status is at baseline  Comments: Facial symmetry is retained  Extraocular movements are retained  Head neck tongue and palate movement are retained and symmetric   Psychiatric:         Mood and Affect: Mood normal          Speech: Speech normal          Behavior: Behavior normal          Thought Content: Thought content normal          Discussion/Summary:    1    Palpitation   lightheadedness   patient has a long history of  palpitation   essentially can terminate the same with Valsalva maneuver   was unsuccessful this time, episode lasted more than 40 minutes,  Patient went to the ER, however the episode had terminated by the time she was seen     episodes are rare, occurring once 1-2 years   patient has very good functional status, doing [phone] steps in a day   no evidence of ischemic heart disease     my recommendation for this patient    If develops palpitation   -  Lay down in bed  -  Valsalva maneuver  -  Ice in a cloth applied over the eyes  - if palpitation lasting for 5 minutes use flecainide 100 mg +  Metoprolol tartrate 25 mg  -  If palpitation for greater than 25 minutes after 1st dose can repeat the same -  Flecainide 100 mg and metoprolol tartrate 25 mg     there is no documentation of SVT on EKG as he ate   The same would be necessary if ER to proceed with ablation  If patient is having recurrent episodes can use 4 week event monitor        2  Baseline high heart rate   Current heart rate 101 at resting  Patient gives a history of same since she was in high school     hemoglobin- 13   TSH 2     would recommend echocardiogram, Holter, tilt-table study   patient only wants the echocardiogram at this time      3  Hypothyroidism  On replacement levothyroxine      4    Antidepressant   Patient is currently on venlafaxine   This is an SNRI   this can be associated with tachycardia resembling that of  postural tachycardia         summary of my recommendation for this patient   Echocardiogram  Use flecainide and metoprolol on an as-needed basis     More detailed evaluation if she gets recurrent and frequent tachycardia

## 2021-04-05 NOTE — PROGRESS NOTES
Cardiology Consultation     Mariluz Lazaro [place]  [id number]  1985  HEART & [facility] [place] [address] [address] [id number]    1  Palpitations  POCT ECG   2  Acquired hypothyroidism     3   Generalized anxiety disorder         History of present illness     patient has been referred evaluation of  Palpitation and tachycardia   According to her was recently in the ER for palpitation    patient has significant medical history of   palpitation  hypothyroidism   anxiety disorder     patient has a long history of elevated baseline heart rate   she can recall from the time she was in high school     she also gives a history of palpitation   Episodes are usually brief, terminated by Valsalva  Current episode was long, more than 40 minutes, went to the ER   however it terminated before she was evaluated  Heart rate was noted to be 200 in a pulse oximeter but was down to 100 by the time she was seen     no anginal like chest pain or pressure  No worsening orthopnea or PND   No leg swelling       episode of palpitation as described   Sudden onset and sudden termination  Prior response to Valsalva maneuver   Episodes are rare and would be happening maybe once in a year   associated with some shortness of breath and lightheadedness  No history of syncope   Some orthostatic lightheadedness     No exertional intolerance  Patient works as a  in more of in college   She is on her feet throughout the day  She approximate up to [phone] steps in a day     patient does not smoke   She does not abuse alcohol   She does not use recreational drugs   She does not use energy drinks     mother has a history of prior ablation      /76 (BP Location: Left arm, Patient Position: Sitting)   Pulse 101   Ht 5' 6" (1 676 m)   Wt 90 5 kg (199 lb 8 oz)   BMI 32 20 kg/m²     Patient Active Problem List   Diagnosis    Generalized anxiety disorder    Herpes zoster without complication    Acquired hypothyroidism    Palpitations     Past Medical History:   Diagnosis Date    Acquired hypothyroidism 12/30/2019    Anxiety     Depression     last assessed 10/20/17     Social History     Socioeconomic History    Marital status: Single     Spouse name: Not on file    Number of children: Not on file    Years of education: Not on file    Highest education level: Not on file   Occupational History    Occupation: currently works full time   Social Needs    Financial resource strain: Not on file    Food insecurity     Worry: Not on file     Inability: Not on file   [employer] needs     Medical: Not on file     Non-medical: Not on file   Tobacco Use    Smoking status: Never Smoker    Smokeless tobacco: Never Used   Substance and Sexual Activity    Alcohol use: Yes     Comment: rarely    Drug use: Never    Sexual activity: Not Currently     Birth control/protection: OCP   Lifestyle    Physical activity     Days per week: Not on file     Minutes per session: Not on file    Stress: Not on file   Relationships    Social connections     Talks on phone: Not on file     Gets together: Not on file     Attends [employer] service: Not on file     Active member of club or organization: Not on file     Attends meetings of clubs or organizations: Not on file     Relationship status: Not on file    Intimate partner violence     Fear of current or ex partner: Not on file     Emotionally abused: Not on file     Physically abused: Not on file     Forced sexual activity: Not on file   Other Topics Concern    Not on file   Social History Narrative    Lives with parents      Family History   Problem Relation Age of Onset    No Known Problems Family      History reviewed  No pertinent surgical history      Current Outpatient Medications:     Black Elderberry,Berry-Flower, 575 MG CAPS, Take by mouth, Disp: , Rfl:     BLISOVI FE 1/20 1-20 MG-MCG per tablet, TAKE 1 TABLET DAILY, Disp: 84 tablet, Rfl: 0    clonazePAM (KlonoPIN) 1 mg tablet, Take 0 5 tablets (0 5 mg total) by mouth 2 (two) times a day as needed for anxiety, Disp: 30 tablet, Rfl: 0    fexofenadine (ALLEGRA) 180 MG tablet, Take 1 tablet by mouth daily as needed, Disp: , Rfl:     fluticasone (FLONASE) 50 mcg/act nasal spray, 2 sprays into each nostril daily, Disp: , Rfl:     levothyroxine 25 mcg tablet, TAKE 1 TABLET DAILY EARLY IN THE MORNING, Disp: 90 tablet, Rfl: 3    multivitamin (THERAGRAN) TABS, Take 1 tablet by mouth daily, Disp: , Rfl:     Olive Leaf Extract 150 MG CAPS, Take 3 capsules by mouth daily, Disp: , Rfl:     venlafaxine (EFFEXOR-XR) 150 mg 24 hr capsule, TAKE 1 CAPSULE DAILY, Disp: 90 capsule, Rfl: 1  Allergies   Allergen Reactions    Cephalexin Fever     Vitals:    04/05/21 1301   BP: 122/76   BP Location: Left arm   Patient Position: Sitting   Pulse: 101   Weight: 90 5 kg (199 lb 8 oz)   Height: 5' 6" (1 676 m)       Labs:  Lab Results   Component Value Date    K 4 4 03/12/2021     (H) 03/12/2021    CO2 28 03/12/2021    BUN 14 03/12/2021    CREATININE 1 03 03/12/2021    CALCIUM 9 8 03/12/2021     No results found for: CKTOTAL, CKMB, CKMBINDEX, TROPONINI  Lab Results   Component Value Date    WBC 5 43 03/12/2021    HGB 13 8 03/12/2021    HCT 43 3 03/12/2021    MCV 95 03/12/2021     03/12/2021     Lab Results   Component Value Date    TRIG 152 (H) 07/03/2020    HDL 59 07/03/2020     Imaging:     Xr Chest 2 Views  Result Date: 3/12/2021  Narrative: CHEST INDICATION:   palpitations  COMPARISON:  None EXAM PERFORMED/VIEWS:  XR CHEST PA & LATERAL  The frontal view was performed utilizing dual energy radiographic technique  Images: 4 FINDINGS: Cardiomediastinal silhouette appears unremarkable  The lungs are clear  No pneumothorax or pleural effusion  Osseous structures appear within normal limits for patient age  Impression: No acute cardiopulmonary disease   Workstation performed: [id number]      Review of Systems:  Review of Systems   All other systems reviewed and are negative  as described in my history of present illness    Physical Exam:  Physical Exam  Constitutional:       Appearance: Normal appearance  She is well-developed  Comments: Not in any distress at the current time   HENT:      Head: Normocephalic and atraumatic  Right Ear: External ear normal       Left Ear: External ear normal       Nose: Nose normal       Mouth/Throat:      Pharynx: Uvula midline  Comments: Thick neck  Eyes:      General: Lids are normal  No scleral icterus  Extraocular Movements: Extraocular movements intact  Conjunctiva/sclera: Conjunctivae normal       Pupils: Pupils are equal, round, and reactive to light  Comments: No pallor  No cyanosis  No icterus   Neck:      Musculoskeletal: Normal range of motion and neck supple  No neck rigidity or muscular tenderness  Thyroid: No thyromegaly  Vascular: No carotid bruit or JVD  Trachea: Trachea normal       Comments: No jugular lymphadenopathy  Cardiovascular:      Rate and Rhythm: Normal rate and regular rhythm  Chest Wall: PMI is not displaced  Pulses: Normal pulses  Heart sounds: Normal heart sounds, S1 normal and S2 normal  No murmur  No friction rub  No gallop  No S3 or S4 sounds  Pulmonary:      Effort: Pulmonary effort is normal  No accessory muscle usage or respiratory distress  Breath sounds: Normal breath sounds  No decreased breath sounds, wheezing, rhonchi or rales  Chest:      Chest wall: No tenderness  Abdominal:      General: Abdomen is flat  Bowel sounds are normal  There is no distension  Palpations: Abdomen is soft  There is no hepatomegaly, splenomegaly or mass  Tenderness: There is no abdominal tenderness  Musculoskeletal: Normal range of motion  General: No swelling, tenderness, deformity or signs of injury     Lymphadenopathy: Cervical: No cervical adenopathy  Skin:     Coloration: Skin is not jaundiced or pale  Findings: No abrasion, bruising, erythema, lesion or rash  Nails: There is no clubbing  Neurological:      General: No focal deficit present  Mental Status: She is alert and oriented to person, place, and time  Mental status is at baseline  Comments: Facial symmetry is retained  Extraocular movements are retained  Head neck tongue and palate movement are retained and symmetric   Psychiatric:         Mood and Affect: Mood normal          Speech: Speech normal          Behavior: Behavior normal          Thought Content: Thought content normal          Discussion/Summary:    1  Palpitation   lightheadedness   patient has a long history of  palpitation   essentially can terminate the same with Valsalva maneuver   was unsuccessful this time, episode lasted more than 40 minutes,  Patient went to the ER, however the episode had terminated by the time she was seen     episodes are rare, occurring once 1-2 years   patient has very good functional status, doing [phone] steps in a day   no evidence of ischemic heart disease     my recommendation for this patient    If develops palpitation   -  Lay down in bed  -  Valsalva maneuver  -  Ice in a cloth applied over the eyes  - if palpitation lasting for 5 minutes use flecainide 100 mg +  Metoprolol tartrate 25 mg  -  If palpitation for greater than 25 minutes after 1st dose can repeat the same -  Flecainide 100 mg and metoprolol tartrate 25 mg     there is no documentation of SVT on EKG as he ate   The same would be necessary if ER to proceed with ablation  If patient is having recurrent episodes can use 4 week event monitor        2    Baseline high heart rate   Current heart rate 101 at resting  Patient gives a history of same since she was in high school     hemoglobin- 13   TSH 2     would recommend echocardiogram, Holter, tilt-table study   patient only wants the echocardiogram at this time      3  Hypothyroidism  On replacement levothyroxine      4    Antidepressant   Patient is currently on venlafaxine   This is an SNRI   this can be associated with tachycardia resembling that of  postural tachycardia         summary of my recommendation for this patient   Echocardiogram  Use flecainide and metoprolol on an as-needed basis     More detailed evaluation if she gets recurrent and frequent tachycardia

## 2021-05-17 ENCOUNTER — HOSPITAL ENCOUNTER (OUTPATIENT)
Dept: NON INVASIVE DIAGNOSTICS | Facility: CLINIC | Age: 36
Discharge: HOME | End: 2021-05-17
Payer: COMMERCIAL

## 2021-05-17 DIAGNOSIS — R00.2 PALPITATIONS: ICD-10-CM

## 2021-05-17 PROCEDURE — 93321 DOPPLER ECHO F-UP/LMTD STD: CPT | Performed by: INTERNAL MEDICINE

## 2021-05-17 PROCEDURE — 93308 TTE F-UP OR LMTD: CPT

## 2021-05-17 PROCEDURE — 93325 DOPPLER ECHO COLOR FLOW MAPG: CPT | Performed by: INTERNAL MEDICINE

## 2021-05-17 PROCEDURE — 93308 TTE F-UP OR LMTD: CPT | Performed by: INTERNAL MEDICINE

## 2021-07-15 ENCOUNTER — OFFICE VISIT (OUTPATIENT)
Dept: FAMILY MEDICINE CLINIC | Facility: CLINIC | Age: 36
End: 2021-07-15
Payer: COMMERCIAL

## 2021-07-15 VITALS
TEMPERATURE: 96.7 F | BODY MASS INDEX: 32.75 KG/M2 | SYSTOLIC BLOOD PRESSURE: 128 MMHG | HEART RATE: 109 BPM | WEIGHT: 203.8 LBS | DIASTOLIC BLOOD PRESSURE: 74 MMHG | OXYGEN SATURATION: 97 % | HEIGHT: 66 IN

## 2021-07-15 DIAGNOSIS — F32.A CHRONIC DEPRESSION: ICD-10-CM

## 2021-07-15 DIAGNOSIS — N18.2 CHRONIC RENAL IMPAIRMENT, STAGE 2 (MILD): ICD-10-CM

## 2021-07-15 DIAGNOSIS — R61 EXCESSIVE SWEATING: ICD-10-CM

## 2021-07-15 DIAGNOSIS — E78.2 MIXED HYPERLIPIDEMIA: ICD-10-CM

## 2021-07-15 DIAGNOSIS — F41.1 GENERALIZED ANXIETY DISORDER: ICD-10-CM

## 2021-07-15 DIAGNOSIS — E03.9 ACQUIRED HYPOTHYROIDISM: ICD-10-CM

## 2021-07-15 DIAGNOSIS — R00.0 SINUS TACHYCARDIA: Primary | ICD-10-CM

## 2021-07-15 PROCEDURE — 99214 OFFICE O/P EST MOD 30 MIN: CPT | Performed by: FAMILY MEDICINE

## 2021-07-15 RX ORDER — OXYBUTYNIN CHLORIDE 5 MG/1
5 TABLET ORAL 2 TIMES DAILY
Qty: 60 TABLET | Refills: 0 | Status: SHIPPED | OUTPATIENT
Start: 2021-07-15 | End: 2021-08-09

## 2021-07-15 NOTE — PROGRESS NOTES
Assessment/Plan:       No problem-specific Assessment & Plan notes found for this encounter  Diagnoses and all orders for this visit:    Sinus tachycardia  Comments:  Advised patient to go to the ER if palpitation persist and to follow with Cardiology    Acquired hypothyroidism  Comments:  Compensated  Continue levothyroxine    Mixed hyperlipidemia  Comments: To follow with low-fat diet  Orders:  -     Lipid Panel with Direct LDL reflex; Future  -     Hepatic function panel; Future    Chronic depression  Comments:  Doing well  Continue medication    Chronic renal impairment, stage 2 (mild)  Comments:  Avoid NSAID and hydrate self well  Orders:  -     PTH, intact; Future  -     UA w Reflex to Microscopic w Reflex to Culture  -     US retroperitoneal complete; Future    Generalized anxiety disorder  Comments:  Controlled  To continue medication    Excessive sweating  Comments:  Advised about side effect of the Triptan including dry mouth and  drowsiness  To avoid driving  To call if symptoms not better  Orders:  -     oxybutynin (DITROPAN) 5 mg tablet; Take 1 tablet (5 mg total) by mouth 2 (two) times a day        Patient Instructions   Follow-up with test results      Orders Placed This Encounter   Procedures    US retroperitoneal complete     Standing Status:   Future     Standing Expiration Date:   7/15/2025     Scheduling Instructions:      13 years and Up - 1)  Full bladder required  2)  Please drink 24-32 oz of water 1 hour prior to appointment time  3)  No voiding 1 hour prior to appointment time  "There are no restrictions with eating unless thepatient is also for an Abdomen, Aorta or Renal Artery Doppler study, then refer to that prep  Assessments for kidneys require you to have a FULL bladder  Patient must drink 24 oz of water 60 minutes before yourscheduled appointment time  Please do not urinate 1 hour before your appointment time   Patient is not to urinate until their Ultrasound is completed  Bladder filling is a key part of this study and needs to be full foraccurate imaging during this exam             Please bring your insurance cards, a form of photo ID and a list of your medications with you  Arrive 15 minutes prior to your appointment time in order shreyas "            To schedule this appointment, please contact Central Scheduling at [phone]  Order Specific Question:   Is a Renal Artery Doppler also being requested in addition to the Kidney/Renal ultrasound ? Answer:   No     Order Specific Question:   Is this order meant to assess the kidney and bladder? Answer:   No    PTH, intact     Standing Status:   Future     Standing Expiration Date:   7/15/2022    UA w Reflex to Microscopic w Reflex to Culture    Lipid Panel with Direct LDL reflex     This is a patient instruction: This test requires patient fasting for 10-12 hours or longer  Drinking of black coffee or black tea is acceptable  Standing Status:   Future     Standing Expiration Date:   7/15/2022    Hepatic function panel     This is a patient instruction: This test is non-fasting  Please drink two glasses of water morning of bloodwork  Standing Status:   Future     Standing Expiration Date:   7/15/2022         Subjective:     Patient ID: Deborah Nova is a 30 y o  female      HPI   New complaints  Excessive sweating  Patient stated in the last 2 years she sweats a lot including face and back  At that time denied being depressed or anxious  She would like to take some medication for it because is really bothering her  Palpitation  Patient stated on March 12, 2021 she was at work she bent forward to  something from the floor and start having palpitation  Patient stated usually it happen but it goes away  But this time she lays down on the floor for 45 minutes and palpitation continue  Somebody has checked her by pulse ox and it was 240   She called ambulance and went to the ER  She followed by Cardiology  Her on p r n  Medication flecainide and metoprolol  Patient stated the room this palpitation happen very infrequently over the last few years  But this time persist   And since she was seen in the ER did not have any other episode  Chronic medical problem  Anxiety  She right now she feels fine  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  Depression also she denied being depressed  Hypothyroid  Denied weight gain, cold intolerance or fatigue  ER report on 3-12-21 noted  All test done at the ER reviewed and discussed with patient  Had echo was normal    Review of Systems   Constitutional: Positive for diaphoresis  Negative for activity change, appetite change, chills, fatigue, fever and unexpected weight change  HENT: Negative for congestion, ear discharge, ear pain, hearing loss, nosebleeds, rhinorrhea, sinus pressure, sore throat, tinnitus, trouble swallowing and voice change  Eyes: Negative for photophobia, pain and visual disturbance  Respiratory: Negative for cough, chest tightness, shortness of breath and wheezing  Cardiovascular: Negative for chest pain and leg swelling  Gastrointestinal: Negative for abdominal pain, anal bleeding, blood in stool, constipation, diarrhea, nausea and vomiting  Endocrine: Negative for cold intolerance, heat intolerance, polydipsia and polyuria  Genitourinary: Negative for dysuria, frequency, hematuria and urgency  Musculoskeletal: Negative for arthralgias, back pain, gait problem, joint swelling, myalgias and neck pain  Skin: Negative for rash  Neurological: Negative for dizziness, tremors, seizures, syncope, weakness, light-headedness and headaches  Hematological: Negative for adenopathy  Does not bruise/bleed easily  Psychiatric/Behavioral: Negative for agitation, behavioral problems, confusion, dysphoric mood, hallucinations and sleep disturbance   The patient is not nervous/anxious  Objective:     Physical Exam  Nursing note reviewed  Constitutional:       General: She is not in acute distress  Appearance: Normal appearance  She is well-developed  She is not ill-appearing, toxic-appearing or diaphoretic  HENT:      Head: Normocephalic  Eyes:      General: No scleral icterus  Extraocular Movements: Extraocular movements intact  Conjunctiva/sclera: Conjunctivae normal    Neck:      Thyroid: No thyromegaly  Vascular: No carotid bruit  Cardiovascular:      Rate and Rhythm: Normal rate and regular rhythm  Heart sounds: Normal heart sounds  Pulmonary:      Effort: Pulmonary effort is normal       Breath sounds: Normal breath sounds  Abdominal:      General: Bowel sounds are normal  There is no distension  Palpations: Abdomen is soft  There is no mass  Tenderness: There is no abdominal tenderness  There is no guarding or rebound  Hernia: No hernia is present  Musculoskeletal:         General: No swelling  Cervical back: Neck supple  Right lower leg: No edema  Left lower leg: No edema  Lymphadenopathy:      Cervical: No cervical adenopathy  Skin:     Findings: No rash  Neurological:      General: No focal deficit present  Mental Status: She is alert and oriented to person, place, and time  Cranial Nerves: No cranial nerve deficit  Sensory: No sensory deficit  Motor: No weakness or abnormal muscle tone  Coordination: Coordination normal       Gait: Gait normal    Psychiatric:         Mood and Affect: Mood normal          Behavior: Behavior normal          Thought Content:  Thought content normal          Judgment: Judgment normal

## 2021-07-16 PROBLEM — N18.2 CHRONIC RENAL IMPAIRMENT, STAGE 2 (MILD): Status: ACTIVE | Noted: 2021-07-16

## 2021-07-16 PROBLEM — R61 EXCESSIVE SWEATING: Status: ACTIVE | Noted: 2021-07-16

## 2021-07-16 PROBLEM — R00.0 SINUS TACHYCARDIA: Status: ACTIVE | Noted: 2021-07-16

## 2021-07-16 PROBLEM — F32.A CHRONIC DEPRESSION: Status: ACTIVE | Noted: 2021-07-16

## 2021-07-16 PROBLEM — E78.2 MIXED HYPERLIPIDEMIA: Status: ACTIVE | Noted: 2021-07-16

## 2021-07-23 ENCOUNTER — HOSPITAL ENCOUNTER (OUTPATIENT)
Dept: ULTRASOUND IMAGING | Facility: MEDICAL CENTER | Age: 36
Discharge: HOME | End: 2021-07-23
Payer: COMMERCIAL

## 2021-07-23 ENCOUNTER — APPOINTMENT (OUTPATIENT)
Dept: LAB | Facility: CLINIC | Age: 36
End: 2021-07-23
Payer: COMMERCIAL

## 2021-07-23 DIAGNOSIS — N18.2 CHRONIC RENAL IMPAIRMENT, STAGE 2 (MILD): ICD-10-CM

## 2021-07-23 DIAGNOSIS — E78.2 MIXED HYPERLIPIDEMIA: ICD-10-CM

## 2021-07-23 LAB
ALBUMIN SERPL BCP-MCNC: 3.5 G/DL (ref 3.5–5)
ALP SERPL-CCNC: 73 U/L (ref 46–116)
ALT SERPL W P-5'-P-CCNC: 23 U/L (ref 12–78)
AST SERPL W P-5'-P-CCNC: 10 U/L (ref 5–45)
BACTERIA UR QL AUTO: ABNORMAL /HPF
BILIRUB DIRECT SERPL-MCNC: 0.07 MG/DL (ref 0–0.2)
BILIRUB SERPL-MCNC: 0.27 MG/DL (ref 0.2–1)
BILIRUB UR QL STRIP: NEGATIVE
CHOLEST SERPL-MCNC: 242 MG/DL (ref 50–200)
CLARITY UR: ABNORMAL
COLOR UR: YELLOW
GLUCOSE UR STRIP-MCNC: NEGATIVE MG/DL
HDLC SERPL-MCNC: 55 MG/DL
HGB UR QL STRIP.AUTO: NEGATIVE
KETONES UR STRIP-MCNC: NEGATIVE MG/DL
LDLC SERPL CALC-MCNC: 150 MG/DL (ref 0–100)
LEUKOCYTE ESTERASE UR QL STRIP: ABNORMAL
MUCOUS THREADS UR QL AUTO: ABNORMAL
NITRITE UR QL STRIP: NEGATIVE
NON-SQ EPI CELLS URNS QL MICRO: ABNORMAL /HPF
PH UR STRIP.AUTO: 5.5 [PH]
PROT SERPL-MCNC: 7.9 G/DL (ref 6.4–8.2)
PROT UR STRIP-MCNC: NEGATIVE MG/DL
PTH-INTACT SERPL-MCNC: 25.8 PG/ML (ref 18.4–80.1)
RBC #/AREA URNS AUTO: ABNORMAL /HPF
SP GR UR STRIP.AUTO: 1.03 (ref 1–1.03)
TRIGL SERPL-MCNC: 184 MG/DL
UROBILINOGEN UR QL STRIP.AUTO: 0.2 E.U./DL
WBC #/AREA URNS AUTO: ABNORMAL /HPF

## 2021-07-23 PROCEDURE — 76770 US EXAM ABDO BACK WALL COMP: CPT

## 2021-07-23 PROCEDURE — 81001 URINALYSIS AUTO W/SCOPE: CPT | Performed by: FAMILY MEDICINE

## 2021-07-23 PROCEDURE — 36415 COLL VENOUS BLD VENIPUNCTURE: CPT

## 2021-07-23 PROCEDURE — 80061 LIPID PANEL: CPT

## 2021-07-23 PROCEDURE — 80076 HEPATIC FUNCTION PANEL: CPT

## 2021-07-23 PROCEDURE — 83970 ASSAY OF PARATHORMONE: CPT

## 2021-07-27 NOTE — RESULT ENCOUNTER NOTE
Spoke to patient and advised she is not interested in starting another medication she will follow with a low fat diet

## 2021-08-01 DIAGNOSIS — F32.A CHRONIC DEPRESSION: ICD-10-CM

## 2021-08-01 DIAGNOSIS — F41.1 GENERALIZED ANXIETY DISORDER: ICD-10-CM

## 2021-08-02 RX ORDER — VENLAFAXINE HYDROCHLORIDE 150 MG/1
CAPSULE, EXTENDED RELEASE ORAL
Qty: 90 CAPSULE | Refills: 3 | Status: SHIPPED | OUTPATIENT
Start: 2021-08-02 | End: 2022-07-28

## 2021-08-07 DIAGNOSIS — R61 EXCESSIVE SWEATING: ICD-10-CM

## 2021-08-09 RX ORDER — OXYBUTYNIN CHLORIDE 5 MG/1
TABLET ORAL
Qty: 60 TABLET | Refills: 0 | Status: SHIPPED | OUTPATIENT
Start: 2021-08-09 | End: 2021-08-27 | Stop reason: SDUPTHER

## 2021-08-27 DIAGNOSIS — R61 EXCESSIVE SWEATING: ICD-10-CM

## 2021-08-27 RX ORDER — OXYBUTYNIN CHLORIDE 5 MG/1
5 TABLET ORAL 2 TIMES DAILY
Qty: 90 TABLET | Refills: 0 | Status: SHIPPED | OUTPATIENT
Start: 2021-08-27 | End: 2021-09-13 | Stop reason: SDUPTHER

## 2021-08-27 NOTE — TELEPHONE ENCOUNTER
Medication: Oxybutynin  Pharmacy: [facility]  Last refill: 8/9/21  Last office visit: 7/15/21  Upcoming office visit: 10/18/21

## 2021-09-13 DIAGNOSIS — R61 EXCESSIVE SWEATING: ICD-10-CM

## 2021-09-13 RX ORDER — OXYBUTYNIN CHLORIDE 5 MG/1
5 TABLET ORAL 2 TIMES DAILY
Qty: 180 TABLET | Refills: 0 | Status: SHIPPED | OUTPATIENT
Start: 2021-09-13 | End: 2021-11-19

## 2021-10-15 ENCOUNTER — TELEPHONE (OUTPATIENT)
Dept: ADMINISTRATIVE | Facility: OTHER | Age: 36
End: 2021-10-15

## 2021-10-18 ENCOUNTER — OFFICE VISIT (OUTPATIENT)
Dept: FAMILY MEDICINE CLINIC | Facility: CLINIC | Age: 36
End: 2021-10-18
Payer: COMMERCIAL

## 2021-10-18 VITALS
TEMPERATURE: 97.1 F | OXYGEN SATURATION: 98 % | WEIGHT: 209 LBS | HEART RATE: 88 BPM | BODY MASS INDEX: 33.59 KG/M2 | HEIGHT: 66 IN | SYSTOLIC BLOOD PRESSURE: 128 MMHG | DIASTOLIC BLOOD PRESSURE: 71 MMHG

## 2021-10-18 DIAGNOSIS — N18.2 CHRONIC RENAL IMPAIRMENT, STAGE 2 (MILD): ICD-10-CM

## 2021-10-18 DIAGNOSIS — E78.2 MIXED HYPERLIPIDEMIA: ICD-10-CM

## 2021-10-18 DIAGNOSIS — Z11.59 NEED FOR HEPATITIS C SCREENING TEST: ICD-10-CM

## 2021-10-18 DIAGNOSIS — R61 EXCESSIVE SWEATING: ICD-10-CM

## 2021-10-18 DIAGNOSIS — F12.90 MARIJUANA USE: ICD-10-CM

## 2021-10-18 DIAGNOSIS — E03.9 ACQUIRED HYPOTHYROIDISM: ICD-10-CM

## 2021-10-18 DIAGNOSIS — R03.0 BLOOD PRESSURE ELEVATED WITHOUT HISTORY OF HTN: ICD-10-CM

## 2021-10-18 DIAGNOSIS — R00.0 SINUS TACHYCARDIA: ICD-10-CM

## 2021-10-18 DIAGNOSIS — Z00.00 WELL ADULT EXAM: Primary | ICD-10-CM

## 2021-10-18 DIAGNOSIS — F41.1 GENERALIZED ANXIETY DISORDER: ICD-10-CM

## 2021-10-18 DIAGNOSIS — F32.A CHRONIC DEPRESSION: ICD-10-CM

## 2021-10-18 DIAGNOSIS — F43.9 STRESS: ICD-10-CM

## 2021-10-18 DIAGNOSIS — Z23 ENCOUNTER FOR IMMUNIZATION: ICD-10-CM

## 2021-10-18 PROCEDURE — 99214 OFFICE O/P EST MOD 30 MIN: CPT | Performed by: FAMILY MEDICINE

## 2021-10-18 PROCEDURE — 3008F BODY MASS INDEX DOCD: CPT | Performed by: FAMILY MEDICINE

## 2021-10-18 PROCEDURE — 99395 PREV VISIT EST AGE 18-39: CPT | Performed by: FAMILY MEDICINE

## 2021-10-18 PROCEDURE — 90682 RIV4 VACC RECOMBINANT DNA IM: CPT

## 2021-10-18 PROCEDURE — 90471 IMMUNIZATION ADMIN: CPT

## 2021-10-18 RX ORDER — NORETHINDRONE ACETATE AND ETHINYL ESTRADIOL 1MG-20(21)
1 KIT ORAL DAILY
COMMUNITY
Start: 2021-08-03 | End: 2022-05-28

## 2021-11-19 DIAGNOSIS — R61 EXCESSIVE SWEATING: ICD-10-CM

## 2021-11-19 RX ORDER — OXYBUTYNIN CHLORIDE 5 MG/1
TABLET ORAL
Qty: 180 TABLET | Refills: 3 | Status: SHIPPED | OUTPATIENT
Start: 2021-11-19 | End: 2022-07-15 | Stop reason: SDUPTHER

## 2021-11-27 ENCOUNTER — IMMUNIZATIONS (OUTPATIENT)
Dept: FAMILY MEDICINE CLINIC | Facility: HOSPITAL | Age: 36
End: 2021-11-27

## 2021-11-27 DIAGNOSIS — Z23 ENCOUNTER FOR IMMUNIZATION: Primary | ICD-10-CM

## 2021-11-27 PROCEDURE — 0001A COVID-19 PFIZER VACC 0.3 ML: CPT

## 2021-11-27 PROCEDURE — 91300 COVID-19 PFIZER VACC 0.3 ML: CPT

## 2021-12-07 ENCOUNTER — TELEPHONE (OUTPATIENT)
Dept: FAMILY MEDICINE CLINIC | Facility: CLINIC | Age: 36
End: 2021-12-07

## 2021-12-07 DIAGNOSIS — M54.16 LUMBAR RADICULOPATHY: Primary | ICD-10-CM

## 2021-12-07 RX ORDER — GABAPENTIN 100 MG/1
CAPSULE ORAL
Qty: 60 CAPSULE | Refills: 0 | Status: SHIPPED | OUTPATIENT
Start: 2021-12-07 | End: 2022-05-13

## 2021-12-08 ENCOUNTER — TELEPHONE (OUTPATIENT)
Dept: FAMILY MEDICINE CLINIC | Facility: CLINIC | Age: 36
End: 2021-12-08

## 2021-12-08 DIAGNOSIS — M54.16 LUMBAR RADICULOPATHY: Primary | ICD-10-CM

## 2021-12-09 ENCOUNTER — EVALUATION (OUTPATIENT)
Dept: PHYSICAL THERAPY | Facility: CLINIC | Age: 36
End: 2021-12-09
Payer: COMMERCIAL

## 2021-12-09 DIAGNOSIS — M54.16 LUMBAR RADICULOPATHY: ICD-10-CM

## 2021-12-09 PROCEDURE — 97161 PT EVAL LOW COMPLEX 20 MIN: CPT | Performed by: PHYSICAL THERAPIST

## 2021-12-13 ENCOUNTER — OFFICE VISIT (OUTPATIENT)
Dept: PHYSICAL THERAPY | Facility: CLINIC | Age: 36
End: 2021-12-13
Payer: COMMERCIAL

## 2021-12-13 DIAGNOSIS — M54.16 LUMBAR RADICULOPATHY: Primary | ICD-10-CM

## 2021-12-13 PROCEDURE — 97110 THERAPEUTIC EXERCISES: CPT | Performed by: PHYSICAL THERAPIST

## 2021-12-14 ENCOUNTER — APPOINTMENT (OUTPATIENT)
Dept: PHYSICAL THERAPY | Facility: CLINIC | Age: 36
End: 2021-12-14
Payer: COMMERCIAL

## 2022-02-17 ENCOUNTER — OFFICE VISIT (OUTPATIENT)
Dept: FAMILY MEDICINE CLINIC | Facility: CLINIC | Age: 37
End: 2022-02-17
Payer: COMMERCIAL

## 2022-02-17 VITALS
HEART RATE: 99 BPM | SYSTOLIC BLOOD PRESSURE: 125 MMHG | WEIGHT: 211 LBS | BODY MASS INDEX: 33.91 KG/M2 | DIASTOLIC BLOOD PRESSURE: 70 MMHG | HEIGHT: 66 IN | TEMPERATURE: 97.2 F | OXYGEN SATURATION: 100 %

## 2022-02-17 DIAGNOSIS — F32.A CHRONIC DEPRESSION: ICD-10-CM

## 2022-02-17 DIAGNOSIS — M54.16 LUMBAR RADICULOPATHY: ICD-10-CM

## 2022-02-17 DIAGNOSIS — R00.0 SINUS TACHYCARDIA: ICD-10-CM

## 2022-02-17 DIAGNOSIS — E78.2 MIXED HYPERLIPIDEMIA: ICD-10-CM

## 2022-02-17 DIAGNOSIS — E03.9 ACQUIRED HYPOTHYROIDISM: Primary | ICD-10-CM

## 2022-02-17 DIAGNOSIS — N18.2 CHRONIC RENAL IMPAIRMENT, STAGE 2 (MILD): ICD-10-CM

## 2022-02-17 DIAGNOSIS — Z11.59 NEED FOR HEPATITIS C SCREENING TEST: ICD-10-CM

## 2022-02-17 DIAGNOSIS — R03.0 BLOOD PRESSURE ELEVATED WITHOUT HISTORY OF HTN: ICD-10-CM

## 2022-02-17 DIAGNOSIS — R61 EXCESSIVE SWEATING: ICD-10-CM

## 2022-02-17 PROCEDURE — 99214 OFFICE O/P EST MOD 30 MIN: CPT | Performed by: FAMILY MEDICINE

## 2022-02-17 PROCEDURE — 3008F BODY MASS INDEX DOCD: CPT | Performed by: FAMILY MEDICINE

## 2022-02-17 NOTE — PROGRESS NOTES
Assessment/Plan:       No problem-specific Assessment & Plan notes found for this encounter  Diagnoses and all orders for this visit:    Acquired hypothyroidism  -     TSH, 3rd generation with Free T4 reflex; Future    Chronic renal impairment, stage 2 (mild)  Comments:  avoid NSAID, hydrate self well  Orders:  -     Lipid Panel with Direct LDL reflex; Future  -     UA (URINE) with reflex to Scope  -     Phosphorus; Future  -     Magnesium; Future  -     PTH, intact; Future    Mixed hyperlipidemia  Comments: Follow with low-fat diet  Orders:  -     Comprehensive metabolic panel; Future  -     CBC and differential; Future  -     Lipid Panel with Direct LDL reflex; Future    Sinus tachycardia  Comments:  controlled    Excessive sweating  Comments:  controlled    Need for hepatitis C screening test  Comments:  pt declined    Chronic depression  Comments:  doing well   Blood pressure elevated without history of HTN  Comments:  BP is normal  today  Lumbar radiculopathy  Comments:  Improved  Discussed checking MRI of the lumbar spine  Patient not interested   To call if symptoms recur  To continue physical therapy        Patient Instructions   To follow up with test results      Orders Placed This Encounter   Procedures    Comprehensive metabolic panel     This is a patient instruction: Patient fasting for 8 hours or longer recommended  Standing Status:   Future     Standing Expiration Date:   2/17/2023    CBC and differential     This is a patient instruction: This test is non-fasting  Please drink two glasses of water morning of bloodwork  Standing Status:   Future     Standing Expiration Date:   2/17/2023    Lipid Panel with Direct LDL reflex     This is a patient instruction: This test requires patient fasting for 10-12 hours or longer  Drinking of black coffee or black tea is acceptable       Standing Status:   Future     Standing Expiration Date:   2/17/2023    UA (URINE) with reflex to Scope    TSH, 3rd generation with Free T4 reflex     Standing Status:   Future     Standing Expiration Date:   2/17/2023    Phosphorus     Standing Status:   Future     Standing Expiration Date:   2/17/2023    Magnesium     Standing Status:   Future     Standing Expiration Date:   2/17/2023    PTH, intact     Standing Status:   Future     Standing Expiration Date:   2/17/2023         Subjective:     Patient ID: Arlene Galvin is a 29 y o  female      HPI  Follow-up chronic medical problems  Hypothyroid  Denied weight gain, cold intolerance or fatigue  Sweating  Patient stated she is doing well well oxybutynin on 1 tablet daily she decreased it from 1 tablet twice a day to 1 tablet  Hyperlipidemia admit to regular fat intake  Denied chest pain  Chronic renal insufficiency  Denied edema  Depression  Patient stated her mood has been stable on Effexor she would like to stay on it, because likely stressful in general   She start a new anurag  Left leg radiculopathy  She said she had physical therapy and her symptoms almost gone  Sinus tachycardia  Patient stated she takes metoprolol and flecainide as needed and she has not needed to take exam them for a while  No test done since last office visit    Review of Systems   Constitutional: Negative for activity change, appetite change and fatigue  HENT: Negative for ear pain, tinnitus, trouble swallowing and voice change  Eyes: Negative for photophobia, pain and visual disturbance  Respiratory: Negative for cough, chest tightness and wheezing  Cardiovascular: Negative for chest pain, palpitations and leg swelling  Gastrointestinal: Negative for abdominal distention, abdominal pain, anal bleeding, constipation, diarrhea, nausea and rectal pain  Endocrine: Negative for cold intolerance, heat intolerance, polydipsia and polyuria     Genitourinary: Negative for decreased urine volume, difficulty urinating, dysuria, flank pain, frequency, hematuria, urgency and vaginal discharge  Musculoskeletal: Negative for arthralgias, back pain, gait problem, myalgias and neck pain  Skin: Negative for pallor and rash  Allergic/Immunologic: Negative for immunocompromised state  Neurological: Negative for dizziness, tremors, seizures, syncope, facial asymmetry, speech difficulty, weakness, light-headedness and headaches  Hematological: Negative for adenopathy  Does not bruise/bleed easily  Psychiatric/Behavioral: Negative for agitation, confusion and hallucinations  The patient is not nervous/anxious  Objective:     Physical Exam  Nursing note reviewed  Constitutional:       General: She is not in acute distress  Appearance: She is well-developed  HENT:      Head: Normocephalic  Eyes:      General: No scleral icterus  Conjunctiva/sclera: Conjunctivae normal       Pupils: Pupils are equal, round, and reactive to light  Neck:      Thyroid: No thyromegaly  Cardiovascular:      Rate and Rhythm: Normal rate and regular rhythm  Pulses:           Carotid pulses are 3+ on the right side and 3+ on the left side  Dorsalis pedis pulses are 3+ on the right side and 3+ on the left side  Heart sounds: Normal heart sounds  Pulmonary:      Effort: Pulmonary effort is normal       Breath sounds: Normal breath sounds  No wheezing  Abdominal:      Palpations: Abdomen is soft  There is no mass  Tenderness: There is no abdominal tenderness  There is no guarding or rebound  Hernia: No hernia is present  Lymphadenopathy:      Cervical: No cervical adenopathy  Skin:     Findings: No rash  Neurological:      Mental Status: She is alert and oriented to person, place, and time  Cranial Nerves: No cranial nerve deficit  Motor: No abnormal muscle tone  Coordination: Coordination normal    Psychiatric:         Behavior: Behavior normal          Thought Content:  Thought content normal          Judgment: Judgment normal

## 2022-03-25 ENCOUNTER — TELEPHONE (OUTPATIENT)
Dept: FAMILY MEDICINE CLINIC | Facility: CLINIC | Age: 37
End: 2022-03-25

## 2022-03-25 NOTE — TELEPHONE ENCOUNTER
Patient called asking if she could get an order in for physical therapy  She states that looks like she did something to her back and this has happened before it is the left lower back side and now walking is bad and also her left leg does not lift as high as her right leg   Please advise

## 2022-03-28 ENCOUNTER — EVALUATION (OUTPATIENT)
Dept: PHYSICAL THERAPY | Facility: CLINIC | Age: 37
End: 2022-03-28

## 2022-03-28 DIAGNOSIS — G58.9 PINCHED NERVE: ICD-10-CM

## 2022-03-28 DIAGNOSIS — M54.50 ACUTE LEFT-SIDED LOW BACK PAIN, UNSPECIFIED WHETHER SCIATICA PRESENT: ICD-10-CM

## 2022-03-28 DIAGNOSIS — M54.50 ACUTE LEFT-SIDED LOW BACK PAIN, UNSPECIFIED WHETHER SCIATICA PRESENT: Primary | ICD-10-CM

## 2022-03-28 PROCEDURE — 97161 PT EVAL LOW COMPLEX 20 MIN: CPT | Performed by: PHYSICAL THERAPIST

## 2022-03-28 NOTE — PROGRESS NOTES
PT Evaluation     Today's date: 3/28/2022  Patient name: Brianna Membreno  : 1985  MRN: [id number]  Referring provider: Donelda Fabry, MD  Dx:   Encounter Diagnosis     ICD-10-CM    1  Acute left-sided low back pain, unspecified whether sciatica present  M54 [id number] Ambulatory Referral to Physical Therapy   2  Pinched nerve  G58 9 Ambulatory Referral to Physical Therapy                  Assessment  Assessment details: 29year old female patient reports to PT with acute left sided low back pain  No red flags noted and patient denies numbness/tingling  Patient presents with directional preference for lumbar extension as post prone press ups with mobilization patient had improved ROM and hip flexion  Patient will benefit from skilled PT services to address current impairments and functional limitations to help patient return to her PLOF  Impairments: abnormal or restricted ROM, abnormal movement, activity intolerance and pain with function  Understanding of Dx/Px/POC: good   Prognosis: good    Goals  STG  1  Patient will be independent with completion of HEP throughout therapy  2  Patient will have at worst 5/10 pain so patient can sleep with less discomfort and get out of bed with less discomfort in 1 weeks  LTG  1  Patient will sit for prolonged periods without increase in symptoms in 2 weeks  2  Patient will lift without increase in symptoms in 3 weeks  3  Patient will complete her job without increase in symptoms in 3 weeks       Plan  Patient would benefit from: skilled physical therapy  Planned therapy interventions: abdominal trunk stabilization, activity modification, joint mobilization, manual therapy, neuromuscular re-education, patient education, stretching, strengthening, therapeutic activities, therapeutic exercise, home exercise program, functional ROM exercises and flexibility  Frequency: 2x week  Duration in weeks: 4  Treatment plan discussed with: patient        Subjective Evaluation    History of Present Illness  Mechanism of injury: Patient reports with left sided low back pain that started about 4 days ago that started to hurt when she went to stand up and pivoted and went to take a step and stumbled  Patient notes similar pain as last time when she had back pain a few months ago  Patient has difficulty sleeping as she can only lie on one side and getting up in the morning is hard for her  Patient notes by the end of the day after working long shifts 3 days in a row she was sore but when she sits it hurts more  Patient has difficulty squatting and lifting  Patient is taking gabapentin  Pain  Current pain ratin  At best pain rating: 3  At worst pain ratin  Quality: dull ache, radiating, burning and sharp  Relieving factors: change in position and medications  Aggravating factors: lifting and sitting    Treatments  Previous treatment: physical therapy  Current treatment: physical therapy  Patient Goals  Patient goals for therapy: decreased pain and increased motion          Objective     Concurrent Complaints  Positive for disturbed sleep  Negative for night pain, bladder dysfunction, bowel dysfunction and saddle (S4) numbness    Neurological Testing     Sensation     Lumbar   Left   Intact: light touch    Right   Intact: light touch    Active Range of Motion     Lumbar   Flexion: Active lumbar flexion: moreso upon returning from flexion    with pain Restriction level: minimal  Extension:  with pain Restriction level: moderate  Mechanical Assessment    Cervical      Thoracic      Lumbar    Standing flexion: repeated movements   Pain intensity: worse  Pain level: increased  Lying extension: repeated movements  Pain location: centralized  Pain intensity: better  Pain level: decreased    General Comments:      Lumbar Comments  Lumbar hypomobility noted              Precautions: none       Manuals 3/28            Lumbar mobs  [address]                                                    Neuro Re-Ed Sitting posture [address]            Ther Ex             Prone press ups w/ exhale [address]                                                                                                       Ther Activity                                       Gait Training                                       Modalities

## 2022-05-10 DIAGNOSIS — F41.1 GENERALIZED ANXIETY DISORDER: ICD-10-CM

## 2022-05-10 DIAGNOSIS — M54.16 LUMBAR RADICULOPATHY: ICD-10-CM

## 2022-05-11 ENCOUNTER — TELEPHONE (OUTPATIENT)
Dept: FAMILY MEDICINE CLINIC | Facility: CLINIC | Age: 37
End: 2022-05-11

## 2022-05-11 NOTE — TELEPHONE ENCOUNTER
Patient called asking for gabapentin prescription to be changed to 300 mg, she states that she was taking it like this and she does not know why now she has been getting gabapentin 100mg please make the change on the dose and send to the [facility] pharmacy on file  Or advise if there is anything that you might need from the patient

## 2022-05-12 NOTE — TELEPHONE ENCOUNTER
I contacte patient regarding her medication  No answer  Not able to leave a message    Her mailbox is full

## 2022-05-13 ENCOUNTER — TELEPHONE (OUTPATIENT)
Dept: FAMILY MEDICINE CLINIC | Facility: CLINIC | Age: 37
End: 2022-05-13

## 2022-05-13 DIAGNOSIS — M54.16 LUMBAR RADICULOPATHY: Primary | ICD-10-CM

## 2022-05-13 RX ORDER — CLONAZEPAM 1 MG/1
0.5 TABLET ORAL 2 TIMES DAILY PRN
Qty: 30 TABLET | Refills: 0 | Status: SHIPPED | OUTPATIENT
Start: 2022-05-13

## 2022-05-13 RX ORDER — GABAPENTIN 100 MG/1
CAPSULE ORAL
Qty: 120 CAPSULE | Refills: 0 | Status: SHIPPED | OUTPATIENT
Start: 2022-05-13

## 2022-05-13 NOTE — TELEPHONE ENCOUNTER
Stated Neurontin does with patient  She was told she takes 100 mg 2-3 daily as needed  Sometimes to the is enough to control her pain and sometimes she need 3 tab

## 2022-05-13 NOTE — TELEPHONE ENCOUNTER
Advised patient to follow-up if symptoms persist also will place prescription for Neurontin 100 mg, 2 tablet twice a day  And she should be taking it consistently  Check LMP

## 2022-05-13 NOTE — TELEPHONE ENCOUNTER
I called patient again to discuss Neurontin dose  No answer    I could not leave a message, because her mailbox is full

## 2022-05-27 ENCOUNTER — NURSE TRIAGE (OUTPATIENT)
Dept: OTHER | Facility: OTHER | Age: 37
End: 2022-05-27

## 2022-05-28 ENCOUNTER — AMB VIDEO VISIT (OUTPATIENT)
Dept: FAMILY MEDICINE CLINIC | Facility: CLINIC | Age: 37
End: 2022-05-28
Payer: COMMERCIAL

## 2022-05-28 DIAGNOSIS — U07.1 COVID-19 VIRUS INFECTION: Primary | ICD-10-CM

## 2022-05-28 DIAGNOSIS — U07.1 COVID-19 VIRUS INFECTION: ICD-10-CM

## 2022-05-28 PROCEDURE — 99213 OFFICE O/P EST LOW 20 MIN: CPT | Performed by: FAMILY MEDICINE

## 2022-05-28 RX ORDER — GUAIFENESIN AND CODEINE PHOSPHATE 100; 10 MG/5ML; MG/5ML
SOLUTION ORAL
Qty: 240 ML | Refills: 0 | Status: SHIPPED | OUTPATIENT
Start: 2022-05-28 | End: 2022-05-28 | Stop reason: SDUPTHER

## 2022-05-28 RX ORDER — GUAIFENESIN AND CODEINE PHOSPHATE 100; 10 MG/5ML; MG/5ML
SOLUTION ORAL
Qty: 240 ML | Refills: 0 | Status: SHIPPED | OUTPATIENT
Start: 2022-05-28

## 2022-05-28 NOTE — TELEPHONE ENCOUNTER
Regarding: [facility]? postive/ medication advice   ----- Message from Alexei Khoury sent at 5/27/2022 10:12 PM EDT -----  Patient called back in  ----- Message from Yoel Rubalcava sent at 5/27/2022  7:59 PM EDT -----  "I just tested positive for Covid and I was looking for some medication advice "

## 2022-05-28 NOTE — PROGRESS NOTES
COVID-19 Outpatient Progress Note    Assessment/Plan:    Problem List Items Addressed This Visit    None     Visit Diagnoses     COVID-19 virus infection    -  Primary         Disposition:     She opts not to use Paxlovid because of possible side effects  Suggest up to 800 mg of ibuprofen three times a day along with two extra strength Tylenol which may be taken together  Robitussin with codeine one or 2 teaspoons every four hours as needed for cough or pain  Recommendation is quarantine for five days  Today is day three  Followed by five days of wearing a mask  I have spent 15 minutes directly with the patient  Encounter provider Heaven Liu MD    Provider located at [address] [place] [address]  [address] [facility] [id number] [phone]    Recent Visits  No visits were found meeting these conditions  Showing recent visits within past 7 days and meeting all other requirements  Today's Visits  Date Type Provider Dept   05/28/22 AMB Video Visit  Heaven Liu MD [facility]   Showing today's visits and meeting all other requirements  Future Appointments  No visits were found meeting these conditions  Showing future appointments within next 150 days and meeting all other requirements     This virtual check-in was done via [email or web address] [address] and patient was informed that this is a secure, HIPAA-compliant platform  She agrees to proceed  Patient agrees to participate in a virtual check in via telephone or video visit instead of presenting to the office to address urgent/immediate medical needs  Patient is aware this is a billable service  After connecting through [facility], the patient was identified by name and date of birth  Pepper Membreno was informed that this was a telemedicine visit and that the exam was being conducted confidentially over secure lines   Erum Membreno acknowledged consent and understanding of privacy and security of the telemedicine visit  I informed the patient that I have reviewed her record in Epic and presented the opportunity for her to ask any questions regarding the visit today  The patient agreed to participate  Verification of patient location:  Patient is located in the following state in which I hold an active license: [place]    Subjective:   Sangita Marrufo is a 29 y o  female who is concerned about COVID-19  Patient's symptoms include fever, fatigue, nasal congestion, sore throat, cough, myalgias and headache  Patient denies shortness of breath, nausea, vomiting and diarrhea  - Date of symptom onset: 5/26/2022      COVID-19 vaccination status: Fully vaccinated with booster    Exposure:   Contact with a person who is under investigation (PUI) for or who is positive for COVID-19 within the last 14 days?: Yes    Negative test 3 days ago  Positive test yesterday  Took Tylenol    No results found for: Lyubov Mendez, [address] [facility], [address] [facility], [place] [address], [facility], [facility]  Past Medical History:   Diagnosis Date    Acquired hypothyroidism 12/30/2019    Anxiety     Depression     last assessed 10/20/17     No past surgical history on file  Current Outpatient Medications   Medication Sig Dispense Refill    Black Elderberry,Berry-Flower, 575 MG CAPS Take by mouth      BLISOVI FE 1/20 1-20 MG-MCG per tablet TAKE 1 TABLET DAILY 84 tablet 0    clonazePAM (KlonoPIN) 1 mg tablet Take 0 5 tablets (0 5 mg total) by mouth as needed in the morning and 0 5 tablets (0 5 mg total) as needed in the evening for anxiety  30 tablet 0    fexofenadine (ALLEGRA) 180 MG tablet Take 1 tablet by mouth daily as needed      flecainide (TAMBOCOR) 100 mg tablet Take 1 tablet (100 mg total) by mouth as needed (Take 1 tablet if palps 5 min, repeat if palps last > 25 min) 30 tablet 3    fluticasone (FLONASE) 50 mcg/act nasal spray 2 sprays into each nostril daily      gabapentin (Neurontin) 100 mg capsule Take 2 tablet twice a day  po 120 capsule 0    levothyroxine 25 mcg tablet TAKE 1 TABLET DAILY EARLY IN THE MORNING 90 tablet 3    metoprolol tartrate (LOPRESSOR) 25 mg tablet Take 1 tablet (25 mg total) by mouth as needed (take 1 tablet as needed for palps lasting 5 min, repeat if palps last > 25 min) 30 tablet 3    multivitamin (THERAGRAN) TABS Take 1 tablet by mouth daily      norethindrone-ethinyl estradiol (JUNEL FE 1/20) 1-20 MG-MCG per tablet Take 1 tablet by mouth daily (Patient not taking: Reported on 10/18/2021)      Olive Leaf Extract 150 MG CAPS Take 3 capsules by mouth daily      oxybutynin (DITROPAN) 5 mg tablet TAKE 1 TABLET TWICE A DAY (Patient taking differently: She takes one tab daily ) 180 tablet 3    venlafaxine (EFFEXOR-XR) 150 mg 24 hr capsule TAKE 1 CAPSULE DAILY 90 capsule 3     No current facility-administered medications for this visit  Allergies   Allergen Reactions    Cephalexin Fever       Review of Systems   Constitutional: Positive for fatigue and fever  HENT: Positive for congestion and sore throat  Respiratory: Positive for cough  Negative for shortness of breath  Gastrointestinal: Negative for diarrhea, nausea and vomiting  Musculoskeletal: Positive for myalgias  Neurological: Positive for headaches  Objective: There were no vitals filed for this visit  Physical Exam  Voice is hoarse  Breathing comfortably  Occasional cough noted  VIRTUAL VISIT DISCLAIMER    Erum Membreno verbally agrees to participate in [employer]  Pt is aware that [employer] could be limited without vital signs or the ability to perform a full hands-on physical exam  Erum Membreno understands she or the provider may request at any time to terminate the video visit and request the patient to seek care or treatment in person

## 2022-05-28 NOTE — PATIENT INSTRUCTIONS
She opts not to use Paxlovid because of possible side effects  Suggest up to 800 mg of ibuprofen three times a day along with two extra strength Tylenol which may be taken together  Robitussin with codeine one or 2 teaspoons every four hours as needed for cough or pain  Recommendation is quarantine for five days  Today is day three  Followed by five days of wearing a mask

## 2022-05-28 NOTE — TELEPHONE ENCOUNTER
Reason for Disposition   [1] COVID-19 diagnosed by positive lab test (e g , PCR, rapid self-test kit) AND [2] mild symptoms (e g , cough, fever, others) AND [3] no complications or SOB    Answer Assessment - Initial Assessment Questions  Were you within 6 feet or less, for up to 15 minutes or more with a person that has a confirmed COVID-19 test?   yes    What was the date of your exposure? Last week    Are you experiencing any symptoms attributed to the virus?  (Assess for SOB, cough, fever, difficulty breathing)   Yes, cough, fever    HIGH RISK: Do you have any history heart or lung conditions, weakened immune system, diabetes, Asthma, CHF, HIV, COPD, Chemo, renal failure, sickle cell, etc?   SVTs    PREGNANCY: Are you pregnant or did you recently give birth?    Denies    VACCINE: "Have you gotten the COVID-19 vaccine?" If Yes ask: "Which one, how many shots, when did you get it?"   Yes, with booster    Protocols used: CORONAVIRUS (COVID-19) [facility]PECT[facility]

## 2022-06-08 DIAGNOSIS — U07.1 COVID-19 VIRUS INFECTION: ICD-10-CM

## 2022-06-09 RX ORDER — GUAIFENESIN AND CODEINE PHOSPHATE 100; 10 MG/5ML; MG/5ML
SOLUTION ORAL
Qty: 240 ML | Refills: 0 | OUTPATIENT
Start: 2022-06-09

## 2022-06-09 NOTE — TELEPHONE ENCOUNTER
Because of the codeine and being out almost 2 weeks, recommend Delsym over the counter, honey, cough lozenges, increase water intake;  Cough may last another week or so; if not improved by then, pt should see PCP    thanks

## 2022-07-15 DIAGNOSIS — E03.9 ACQUIRED HYPOTHYROIDISM: ICD-10-CM

## 2022-07-15 DIAGNOSIS — R61 EXCESSIVE SWEATING: ICD-10-CM

## 2022-07-15 RX ORDER — OXYBUTYNIN CHLORIDE 5 MG/1
TABLET ORAL
Qty: 90 TABLET | Refills: 3 | OUTPATIENT
Start: 2022-07-15

## 2022-07-15 RX ORDER — LEVOTHYROXINE SODIUM 0.03 MG/1
TABLET ORAL
Qty: 90 TABLET | Refills: 3 | OUTPATIENT
Start: 2022-07-15

## 2022-07-15 RX ORDER — OXYBUTYNIN CHLORIDE 5 MG/1
TABLET ORAL
Qty: 90 TABLET | Refills: 3 | Status: SHIPPED | OUTPATIENT
Start: 2022-07-15

## 2022-07-15 RX ORDER — LEVOTHYROXINE SODIUM 0.03 MG/1
TABLET ORAL
Qty: 90 TABLET | Refills: 3 | Status: SHIPPED | OUTPATIENT
Start: 2022-07-15

## 2022-07-28 DIAGNOSIS — F32.A CHRONIC DEPRESSION: ICD-10-CM

## 2022-07-28 DIAGNOSIS — F41.1 GENERALIZED ANXIETY DISORDER: ICD-10-CM

## 2022-07-28 RX ORDER — VENLAFAXINE HYDROCHLORIDE 150 MG/1
CAPSULE, EXTENDED RELEASE ORAL
Qty: 90 CAPSULE | Refills: 3 | Status: SHIPPED | OUTPATIENT
Start: 2022-07-28

## 2022-08-18 ENCOUNTER — OFFICE VISIT (OUTPATIENT)
Dept: FAMILY MEDICINE CLINIC | Facility: CLINIC | Age: 37
End: 2022-08-18
Payer: COMMERCIAL

## 2022-08-18 VITALS
SYSTOLIC BLOOD PRESSURE: 120 MMHG | WEIGHT: 207.6 LBS | TEMPERATURE: 96.7 F | HEART RATE: 74 BPM | OXYGEN SATURATION: 96 % | DIASTOLIC BLOOD PRESSURE: 84 MMHG | BODY MASS INDEX: 33.37 KG/M2 | HEIGHT: 66 IN

## 2022-08-18 DIAGNOSIS — R00.0 SINUS TACHYCARDIA: ICD-10-CM

## 2022-08-18 DIAGNOSIS — R03.0 BLOOD PRESSURE ELEVATED WITHOUT HISTORY OF HTN: ICD-10-CM

## 2022-08-18 DIAGNOSIS — F32.A CHRONIC DEPRESSION: ICD-10-CM

## 2022-08-18 DIAGNOSIS — R61 EXCESSIVE SWEATING: ICD-10-CM

## 2022-08-18 DIAGNOSIS — F41.1 GENERALIZED ANXIETY DISORDER: ICD-10-CM

## 2022-08-18 DIAGNOSIS — E03.9 ACQUIRED HYPOTHYROIDISM: ICD-10-CM

## 2022-08-18 DIAGNOSIS — N18.2 CHRONIC RENAL IMPAIRMENT, STAGE 2 (MILD): Primary | ICD-10-CM

## 2022-08-18 DIAGNOSIS — E78.2 MIXED HYPERLIPIDEMIA: ICD-10-CM

## 2022-08-18 PROCEDURE — 99214 OFFICE O/P EST MOD 30 MIN: CPT | Performed by: FAMILY MEDICINE

## 2022-08-18 NOTE — PROGRESS NOTES
Assessment/Plan:       No problem-specific Assessment & Plan notes found for this encounter  Diagnoses and all orders for this visit:    Chronic renal impairment, stage 2 (mild)  Comments:  Advised patient to hydrate herself foot  Avoid NSAID  T/C Neph referral    Acquired hypothyroidism  Comments:  Check TSH  Order exit    Generalized anxiety disorder  Comments:  Overall anxiety controlled  Consel patient  Continue medication  Call if any further issue    Chronic depression  Comments:  Overall doing okay  Continue medication    Excessive sweating  Comments:  Controlled    Sinus tachycardia    Blood pressure elevated without history of HTN  Comments:  Most likely secondary to stress  Advised to check blood sugar at home    Mixed hyperlipidemia  Comments: To follow with low-fat diet        Patient Instructions   To get test as directed with  last ov    To follow with test results      No orders of the defined types were placed in this encounter  Subjective:     Patient ID: Viry Melo is a 40 y o  female      HPI  Palpitation  , not need to take metoprolol or flecainide for long time  Hypothyroid  Admit weight gain, cold intolerance or fatigue  Anxiety   Patient stated overall she is managing her anxiety  Also recently she had been ended stress taking care of her sick mother  And also short staffed at her work  Depression  Denied depression  Taking medication as directed  hypelipidemia , not follow ing with  Good diet  Sweating  Well controlled with oxybutynin     labs not done  Review of Systems   Constitutional: Negative for chills, diaphoresis and fatigue  HENT: Negative for ear pain, sore throat, trouble swallowing and voice change  Eyes: Negative for visual disturbance  Respiratory: Negative for cough, chest tightness and shortness of breath  Cardiovascular: Negative for chest pain, palpitations and leg swelling     Gastrointestinal: Negative for abdominal pain, blood in stool, constipation, diarrhea and nausea  Endocrine: Negative for polydipsia and polyuria  Genitourinary: Negative for dysuria, flank pain, frequency, hematuria, pelvic pain, urgency, vaginal bleeding and vaginal discharge  Musculoskeletal: Negative for arthralgias, back pain, gait problem, myalgias and neck pain  Skin: Negative for rash  Allergic/Immunologic: Negative for food allergies  Neurological: Negative for dizziness, tremors, seizures, weakness, light-headedness, numbness and headaches  Hematological: Negative for adenopathy  Does not bruise/bleed easily  Psychiatric/Behavioral: Negative for confusion  The patient is not nervous/anxious  Objective:     Physical Exam  Constitutional:       General: She is not in acute distress  Appearance: Normal appearance  She is well-developed  She is not ill-appearing, toxic-appearing or diaphoretic  HENT:      Head: Normocephalic and atraumatic  Eyes:      General: No scleral icterus  Conjunctiva/sclera: Conjunctivae normal       Pupils: Pupils are equal, round, and reactive to light  Neck:      Thyroid: No thyromegaly  Vascular: No carotid bruit or JVD  Cardiovascular:      Rate and Rhythm: Normal rate and regular rhythm  Heart sounds: Normal heart sounds  No murmur heard  Comments: Extremities  No edema  Pulmonary:      Effort: Pulmonary effort is normal       Breath sounds: Normal breath sounds  Abdominal:      General: There is no distension  Palpations: Abdomen is soft  There is no mass  Tenderness: There is no abdominal tenderness  There is no guarding or rebound  Hernia: No hernia is present  Musculoskeletal:         General: No swelling  Right lower leg: No edema  Left lower leg: No edema  Lymphadenopathy:      Cervical: No cervical adenopathy  Skin:     Coloration: Skin is not jaundiced or pale  Findings: No bruising or rash     Neurological:      General: No focal deficit present  Mental Status: She is alert and oriented to person, place, and time  Cranial Nerves: No cranial nerve deficit  Sensory: No sensory deficit  Motor: No weakness or abnormal muscle tone  Coordination: Coordination normal       Gait: Gait normal    Psychiatric:         Mood and Affect: Mood normal          Behavior: Behavior normal          Thought Content:  Thought content normal          Judgment: Judgment normal

## 2022-10-12 PROBLEM — Z00.00 WELL ADULT EXAM: Status: RESOLVED | Noted: 2021-10-18 | Resolved: 2022-10-12

## 2022-11-10 ENCOUNTER — RA CDI HCC (OUTPATIENT)
Dept: OTHER | Facility: HOSPITAL | Age: 37
End: 2022-11-10

## 2022-11-10 NOTE — PROGRESS NOTES
[facility]  coding opportunities          Chart Reviewed number of suggestions sent to Provider: 1   Depression  Patients Insurance        Commercial Insurance: [employer]

## 2022-11-17 ENCOUNTER — OFFICE VISIT (OUTPATIENT)
Dept: FAMILY MEDICINE CLINIC | Facility: CLINIC | Age: 37
End: 2022-11-17

## 2022-11-17 VITALS
TEMPERATURE: 97.6 F | HEART RATE: 131 BPM | WEIGHT: 212 LBS | BODY MASS INDEX: 34.07 KG/M2 | OXYGEN SATURATION: 98 % | HEIGHT: 66 IN

## 2022-11-17 DIAGNOSIS — E66.09 CLASS 1 OBESITY DUE TO EXCESS CALORIES WITHOUT SERIOUS COMORBIDITY WITH BODY MASS INDEX (BMI) OF 34.0 TO 34.9 IN ADULT: ICD-10-CM

## 2022-11-17 DIAGNOSIS — F41.1 GENERALIZED ANXIETY DISORDER: ICD-10-CM

## 2022-11-17 DIAGNOSIS — R05.1 ACUTE COUGH: ICD-10-CM

## 2022-11-17 DIAGNOSIS — F32.A CHRONIC DEPRESSION: ICD-10-CM

## 2022-11-17 DIAGNOSIS — E03.9 ACQUIRED HYPOTHYROIDISM: ICD-10-CM

## 2022-11-17 DIAGNOSIS — Z00.00 ANNUAL PHYSICAL EXAM: Primary | ICD-10-CM

## 2022-11-17 NOTE — PROGRESS NOTES
ADULT ANNUAL [address] PRIMARY CARE    NAME: Erum Membreno  AGE: 40 y o  SEX: female  : 1985     DATE: 2022     Assessment and Plan:     Problem List Items Addressed This Visit        Endocrine    Acquired hypothyroidism     - Continue levothyroxine 25 mcg  - TSH ordered at previous office visit; patient advised to obtain this at her earliest convenience             Other    Generalized anxiety disorder    Relevant Orders    Ambulatory Referral to Social Work Care Management Program    Chronic depression     - Continue Venlafaxine 150 mg daily and Klonopin 0 5mg BID PRN  - Referral to social work placed in order to connect patient with mental health resources in the community          Relevant Orders    Ambulatory Referral to Social Work Care Management Program    Annual physical exam - Primary     - Satisfactory physical examination  - Follow up in 6 months or PRN         Acute cough     - 3 week history of cough likely post infectious  Discussed other causes including reflux, allergic rhinitis and asthma  Advised that patient may take over the counter medication and keep well hydrated  CXR placed should symptoms fail to resolve or worsen         Relevant Orders    XR chest pa & lateral    Class 1 obesity due to excess calories without serious comorbidity with body mass index (BMI) of 34 0 to 34 9 in adult     BMI Counseling: Body mass index is 34 22 kg/m²  The BMI is above normal  Nutrition recommendations include decreasing overall calorie intake, 3-5 servings of fruits/vegetables daily, reducing fast food intake, consuming healthier snacks, decreasing soda and/or juice intake and moderation in carbohydrate intake  Exercise recommendations include moderate aerobic physical activity for 150 minutes/week  Immunizations and preventive care screenings were discussed with patient today   Appropriate education was printed on patient's after visit summary  Counseling:  Alcohol/drug use: discussed moderation in alcohol intake, the recommendations for healthy alcohol use, and avoidance of illicit drug use  Dental Health: discussed importance of regular tooth brushing, flossing, and dental visits  · Exercise: the importance of regular exercise/physical activity was discussed  Recommend exercise 3-5 times per week for at least 30 minutes  No follow-ups on file  Chief Complaint:     Chief Complaint   Patient presents with   • Physical Exam      History of Present Illness:     Adult Annual Physical   Anaaustyn Samson is a 40year old female with a past medical history of hypothyroidism, anxiety and depression who presents today for an annual physical examination  Patient is complaining of 3 week history of cough following a viral illness  Patient states that the cough is dry and she denies any associated shortness of breath, chest tightness, chest pain, fever, chills, weight loss or night sweats  Patient also states that she has been under a lot of stress at home and at home  Unfortunately her mother had a stroke after she was hospitalized with a hip injury and patient was taking care of her  She also reports that her step father was diagnosed with terminal cancer  She is on Venlafaxine and Klonopin PRN for her depression and anxiety but is interested in seeing a therapist      Diet and Physical Activity  · Diet/Nutrition: poor diet  · Exercise: no formal exercise  Depression Screening  PHQ-2/9 Depression Screening         General Health  · Sleep: Gets 6-8 hours of sleep on average  · Hearing: No hearing issues  · Vision: previous LASIK surgery  · Dental: regular dental visits  /GYN Health  · Last menstrual period: No LMP recorded  (Menstrual status: Birth Control)  · Contraceptive method: oral contraceptives  · History of STDs?: no      Review of Systems:     Review of Systems   Constitutional: Negative      HENT: Negative  Eyes: Negative  Respiratory: Positive for cough  Negative for chest tightness, shortness of breath and wheezing  Cardiovascular: Negative  Gastrointestinal: Negative  Genitourinary: Negative  Musculoskeletal: Negative  Skin: Negative  Neurological: Negative  Psychiatric/Behavioral: Negative  Past Medical History:     Past Medical History:   Diagnosis Date   • Acquired hypothyroidism 12/30/2019   • Anxiety    • Depression     last assessed 10/20/17      Past Surgical History:     No past surgical history on file     Social History:     Social History     Socioeconomic History   • Marital status: Single     Spouse name: None   • Number of children: None   • Years of education: None   • Highest education level: None   Occupational History   • Occupation: currently works full time   Tobacco Use   • Smoking status: Never   • Smokeless tobacco: Never   Vaping Use   • Vaping Use: Never used   Substance and Sexual Activity   • Alcohol use: Yes     Comment: rarely   • Drug use: Yes     Types: Marijuana     Comment: quit may 2021   • Sexual activity: Not Currently     Birth control/protection: OCP   Other Topics Concern   • None   Social History Narrative    Lives with parents     Social Determinants of Health     Financial Resource Strain: Not on file   Food Insecurity: Not on file   Transportation Needs: Not on file   Physical Activity: Not on file   Stress: Not on file   Social Connections: Not on file   Intimate Partner Violence: Not on file   Housing Stability: Not on file      Family History:     Family History   Problem Relation Age of Onset   • No Known Problems Family       Current Medications:     Current Outpatient Medications   Medication Sig Dispense Refill   • Black Elderberry,Berry-Flower, 575 MG CAPS Take by mouth     • BLISOVI FE 1/20 1-20 MG-MCG per tablet TAKE 1 TABLET DAILY 84 tablet 0   • clonazePAM (KlonoPIN) 1 mg tablet Take 0 5 tablets (0 5 mg total) by mouth as needed in the morning and 0 5 tablets (0 5 mg total) as needed in the evening for anxiety  30 tablet 0   • fexofenadine (ALLEGRA) 180 MG tablet Take 1 tablet by mouth daily as needed     • flecainide (TAMBOCOR) 100 mg tablet Take 1 tablet (100 mg total) by mouth as needed (Take 1 tablet if palps 5 min, repeat if palps last > 25 min) 30 tablet 3   • fluticasone (FLONASE) 50 mcg/act nasal spray 2 sprays into each nostril daily     • gabapentin (Neurontin) 100 mg capsule Take 2 tablet twice a day  po 120 capsule 0   • levothyroxine 25 mcg tablet Take one tablet in the AM 90 tablet 3   • metoprolol tartrate (LOPRESSOR) 25 mg tablet Take 1 tablet (25 mg total) by mouth as needed (take 1 tablet as needed for palps lasting 5 min, repeat if palps last > 25 min) 30 tablet 3   • multivitamin (THERAGRAN) TABS Take 1 tablet by mouth daily     • Olive Leaf Extract 150 MG CAPS Take 3 capsules by mouth daily     • oxybutynin (DITROPAN) 5 mg tablet She takes one tab daily 90 tablet 3   • venlafaxine (EFFEXOR-XR) 150 mg 24 hr capsule TAKE 1 CAPSULE DAILY 90 capsule 3     No current facility-administered medications for this visit  Allergies: Allergies   Allergen Reactions   • Cephalexin Fever      Physical Exam:     Pulse (!) 131   Temp 97 6 °F (36 4 °C) (Temporal)   Ht 5' 6" (1 676 m)   Wt 96 2 kg (212 lb)   SpO2 98%   BMI 34 22 kg/m²     Physical Exam  Constitutional:       General: She is not in acute distress  Appearance: She is not ill-appearing  HENT:      Head: Normocephalic and atraumatic  Mouth/Throat:      Mouth: Mucous membranes are moist       Pharynx: No oropharyngeal exudate or posterior oropharyngeal erythema  Eyes:      General:         Right eye: No discharge  Left eye: No discharge  Extraocular Movements: Extraocular movements intact  Pupils: Pupils are equal, round, and reactive to light  Cardiovascular:      Rate and Rhythm: Normal rate     Pulmonary:      Effort: Pulmonary effort is normal  No respiratory distress  Breath sounds: No wheezing  Abdominal:      General: Bowel sounds are normal  There is no distension  Palpations: Abdomen is soft  Tenderness: There is no abdominal tenderness  There is no guarding  Musculoskeletal:      Cervical back: Normal range of motion  Right lower leg: No edema  Left lower leg: No edema  Neurological:      General: No focal deficit present  Mental Status: She is alert  Motor: No weakness        Coordination: Coordination normal       Gait: Gait normal       Deep Tendon Reflexes: Reflexes normal    Psychiatric:         Mood and Affect: Mood normal          Behavior: Behavior normal           Priya Carr MD   [facility]

## 2022-11-18 ENCOUNTER — PATIENT OUTREACH (OUTPATIENT)
Dept: FAMILY MEDICINE CLINIC | Facility: CLINIC | Age: 37
End: 2022-11-18

## 2022-11-18 PROBLEM — E66.811 CLASS 1 OBESITY DUE TO EXCESS CALORIES WITHOUT SERIOUS COMORBIDITY WITH BODY MASS INDEX (BMI) OF 34.0 TO 34.9 IN ADULT: Status: ACTIVE | Noted: 2022-11-18

## 2022-11-18 PROBLEM — E66.09 CLASS 1 OBESITY DUE TO EXCESS CALORIES WITHOUT SERIOUS COMORBIDITY WITH BODY MASS INDEX (BMI) OF 34.0 TO 34.9 IN ADULT: Status: ACTIVE | Noted: 2022-11-18

## 2022-11-18 PROBLEM — R05.1 ACUTE COUGH: Status: ACTIVE | Noted: 2022-11-18

## 2022-11-18 NOTE — ASSESSMENT & PLAN NOTE
BMI Counseling: Body mass index is 34 22 kg/m²  The BMI is above normal  Nutrition recommendations include decreasing overall calorie intake, 3-5 servings of fruits/vegetables daily, reducing fast food intake, consuming healthier snacks, decreasing soda and/or juice intake and moderation in carbohydrate intake  Exercise recommendations include moderate aerobic physical activity for 150 minutes/week

## 2022-11-18 NOTE — ASSESSMENT & PLAN NOTE
- 3 week history of cough likely post infectious  Discussed other causes including reflux, allergic rhinitis and asthma  Advised that patient may take over the counter medication and keep well hydrated   CXR placed should symptoms fail to resolve or worsen

## 2022-11-18 NOTE — ASSESSMENT & PLAN NOTE
- Continue levothyroxine 25 mcg  - TSH ordered at previous office visit; patient advised to obtain this at her earliest convenience

## 2022-11-18 NOTE — ASSESSMENT & PLAN NOTE
- Continue Venlafaxine 150 mg daily and Klonopin 0 5mg BID PRN  - Referral to social work placed in order to connect patient with mental health resources in the community

## 2022-11-18 NOTE — PROGRESS NOTES
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  Explained that most therapists still allow virtual or in person  Confirmed email and sent a list of providers and asked that pt contact me if she needs additional help or if she gets an appt  Email sent:    Hello,     Here is the list of outpatient mental health providers  Please let me know if you need additional help or if you get an appointment  You can also go on psychologytoday  com and type in your zip code and see a list of providers in your area along with their picture and a little paragraph about them  [facility]: [address] [facility] [address] Briseyda Gtz  [facility]  [address]  [facility], [address] [phone]  [facility] - [facility]  [address] [facility], [facility] [address] [place] [address], 120 [facility] [phone]  [facility]- [address] [place] [address] [address] - [address] [facility] [place] [address] [facility] - [address] [phone]  [facility] -[address] [facility] - [address] [facility] [address] 433 [facility], [address] [phone]        [address] [place], [place] [phone]    [facility] [address] [place], [address] [phone]  [facility] [phone]  [facility], [address] [phone]- Telehealth only   [address] [facility], [address] [phone]  [facility] - [place] [id number] [place] [phone] (also has partial program)   [facility] [address]  [facility], [address] [phone]  [facility] - counseling only- [phone]    [facility]  [address] [place] [address] [phone]    [facility]  [facility] and [facility] [phone]    Thank you,     Yessica Ames, MSW, [place] [address]  [phone]  [email or web address]

## 2022-12-10 ENCOUNTER — APPOINTMENT (OUTPATIENT)
Dept: RADIOLOGY | Facility: MEDICAL CENTER | Age: 37
End: 2022-12-10

## 2022-12-10 DIAGNOSIS — R05.1 ACUTE COUGH: ICD-10-CM

## 2023-01-17 PROBLEM — R05.1 ACUTE COUGH: Status: RESOLVED | Noted: 2022-11-18 | Resolved: 2023-01-17

## 2023-02-03 DIAGNOSIS — F41.1 GENERALIZED ANXIETY DISORDER: ICD-10-CM

## 2023-02-04 RX ORDER — CLONAZEPAM 1 MG/1
0.5 TABLET ORAL 2 TIMES DAILY PRN
Qty: 30 TABLET | Refills: 0 | Status: SHIPPED | OUTPATIENT
Start: 2023-02-04

## 2023-05-11 ENCOUNTER — RA CDI HCC (OUTPATIENT)
Dept: OTHER | Facility: HOSPITAL | Age: 38
End: 2023-05-11

## 2023-05-11 NOTE — PROGRESS NOTES
[facility] 75  coding opportunities       Chart reviewed, no opportunity found: CHART REVIEWED, NO OPPORTUNITY FOUND        Patients Insurance        Commercial Insurance: [employer]

## 2023-05-18 ENCOUNTER — OFFICE VISIT (OUTPATIENT)
Dept: FAMILY MEDICINE CLINIC | Facility: CLINIC | Age: 38
End: 2023-05-18

## 2023-05-18 VITALS
DIASTOLIC BLOOD PRESSURE: 80 MMHG | HEIGHT: 66 IN | TEMPERATURE: 97.2 F | BODY MASS INDEX: 34.36 KG/M2 | SYSTOLIC BLOOD PRESSURE: 104 MMHG | OXYGEN SATURATION: 98 % | HEART RATE: 114 BPM | WEIGHT: 213.8 LBS

## 2023-05-18 DIAGNOSIS — E78.2 MIXED HYPERLIPIDEMIA: ICD-10-CM

## 2023-05-18 DIAGNOSIS — F32.A CHRONIC DEPRESSION: ICD-10-CM

## 2023-05-18 DIAGNOSIS — F41.1 GENERALIZED ANXIETY DISORDER: ICD-10-CM

## 2023-05-18 DIAGNOSIS — E03.9 ACQUIRED HYPOTHYROIDISM: Primary | ICD-10-CM

## 2023-05-18 DIAGNOSIS — E66.09 CLASS 1 OBESITY DUE TO EXCESS CALORIES WITHOUT SERIOUS COMORBIDITY WITH BODY MASS INDEX (BMI) OF 34.0 TO 34.9 IN ADULT: ICD-10-CM

## 2023-05-18 DIAGNOSIS — R61 EXCESSIVE SWEATING: ICD-10-CM

## 2023-05-18 DIAGNOSIS — R00.0 SINUS TACHYCARDIA: ICD-10-CM

## 2023-05-18 DIAGNOSIS — M54.16 LUMBAR RADICULOPATHY: ICD-10-CM

## 2023-05-18 PROBLEM — Z00.00 ANNUAL PHYSICAL EXAM: Status: RESOLVED | Noted: 2021-10-18 | Resolved: 2023-05-18

## 2023-05-18 PROBLEM — F43.9 STRESS: Status: RESOLVED | Noted: 2021-10-18 | Resolved: 2023-05-18

## 2023-05-18 PROBLEM — R03.0 BLOOD PRESSURE ELEVATED WITHOUT HISTORY OF HTN: Status: RESOLVED | Noted: 2021-10-18 | Resolved: 2023-05-18

## 2023-05-18 RX ORDER — VENLAFAXINE HYDROCHLORIDE 150 MG/1
150 CAPSULE, EXTENDED RELEASE ORAL DAILY
Qty: 90 CAPSULE | Refills: 3 | Status: SHIPPED | OUTPATIENT
Start: 2023-05-18

## 2023-05-18 RX ORDER — GABAPENTIN 100 MG/1
CAPSULE ORAL
Qty: 120 CAPSULE | Refills: 0 | Status: CANCELLED | OUTPATIENT
Start: 2023-05-18

## 2023-05-18 RX ORDER — OXYBUTYNIN CHLORIDE 5 MG/1
TABLET ORAL
Qty: 90 TABLET | Refills: 3 | Status: SHIPPED | OUTPATIENT
Start: 2023-05-18

## 2023-05-18 RX ORDER — GABAPENTIN 100 MG/1
CAPSULE ORAL
Qty: 120 CAPSULE | Refills: 0 | Status: SHIPPED | OUTPATIENT
Start: 2023-05-18

## 2023-05-18 RX ORDER — LEVOTHYROXINE SODIUM 0.03 MG/1
TABLET ORAL
Qty: 90 TABLET | Refills: 3 | Status: SHIPPED | OUTPATIENT
Start: 2023-05-18 | End: 2023-05-22

## 2023-05-18 NOTE — PROGRESS NOTES
Assessment/Plan:    No problem-specific Assessment & Plan notes found for this encounter  Diagnoses and all orders for this visit:    Acquired hypothyroidism  -     levothyroxine 25 mcg tablet; Take one tablet in the AM  -     TSH, 3rd generation with Free T4 reflex; Future    Generalized anxiety disorder  -     venlafaxine (EFFEXOR-XR) 150 mg 24 hr capsule; Take 1 capsule (150 mg total) by mouth daily    Chronic depression  -     venlafaxine (EFFEXOR-XR) 150 mg 24 hr capsule; Take 1 capsule (150 mg total) by mouth daily    Excessive sweating  -     oxybutynin (DITROPAN) 5 mg tablet; She takes one tab daily    Lumbar radiculopathy  -     gabapentin (Neurontin) 100 mg capsule; Take 2 tablet twice a day  po    Mixed hyperlipidemia  -     Lipid Panel with Direct LDL reflex; Future    Class 1 obesity due to excess calories without serious comorbidity with body mass index (BMI) of 34 0 to 34 9 in adult  -     CBC and Platelet; Future  -     Comprehensive metabolic panel; Future  -     HEMOGLOBIN A1C W/ EAG ESTIMATION; Future        Overall patient is stable at this time  She will continue Venlafaxine 150mg daily for anxiety and depression in conjunction with therapy services  She will continue Levothyroxine 25mcg for hypothyroidism and will follow up on TSH which was reordered  Patient was noted to be tachycardic today, she does have metoprolol and flecainide which was prescribed by cardiology but states that she has only had to use it once she our last office visit  She will follow up with cardiology if symptoms persist      Subjective:      Patient ID: Leatha Rubinstein is a 40 y o  female  HPI  Leatha Rubinstein is a pleasant 40year old female with a past medical history of anxiety, depression, and dyslipidemia who presents today for a follow up   Patient still reports that she is under a significant amount of stress both at work and at home taking care of her mother who had a stroke and a father with terminal "cancer  She did recently start therapy sessions  She was unable to get her lab work completed  She is requesting a refill of her medications  The following portions of the patient's history were reviewed and updated as appropriate: allergies, current medications, past family history, past medical history, past social history, past surgical history and problem list     Review of Systems   Constitutional: Negative  HENT: Negative  Eyes: Negative  Respiratory: Negative  Cardiovascular: Negative  Gastrointestinal: Negative  Genitourinary: Negative  Musculoskeletal: Negative  Skin: Negative  Neurological: Negative  Psychiatric/Behavioral: Negative  Objective:      /80 (BP Location: Left arm, Patient Position: Sitting, Cuff Size: Large)   Pulse (!) 114   Temp (!) 97 2 °F (36 2 °C) (Temporal)   Ht 5' 6\" (1 676 m)   Wt 97 kg (213 lb 12 8 oz)   SpO2 98%   BMI 34 51 kg/m²          Physical Exam  Constitutional:       General: She is not in acute distress  Appearance: She is not ill-appearing  HENT:      Head: Normocephalic and atraumatic  Mouth/Throat:      Mouth: Mucous membranes are moist       Pharynx: No oropharyngeal exudate or posterior oropharyngeal erythema  Eyes:      General:         Right eye: No discharge  Left eye: No discharge  Extraocular Movements: Extraocular movements intact  Pupils: Pupils are equal, round, and reactive to light  Cardiovascular:      Rate and Rhythm: Normal rate  Pulmonary:      Effort: No respiratory distress  Breath sounds: No wheezing  Abdominal:      General: Bowel sounds are normal  There is no distension  Palpations: Abdomen is soft  Tenderness: There is no abdominal tenderness  There is no guarding  Musculoskeletal:      Right lower leg: No edema  Left lower leg: No edema  Neurological:      General: No focal deficit present  Mental Status: She is alert   " Psychiatric:         Mood and Affect: Mood normal          Behavior: Behavior normal

## 2023-05-19 ENCOUNTER — APPOINTMENT (OUTPATIENT)
Dept: LAB | Facility: MEDICAL CENTER | Age: 38
End: 2023-05-19

## 2023-05-19 DIAGNOSIS — E03.9 ACQUIRED HYPOTHYROIDISM: ICD-10-CM

## 2023-05-19 DIAGNOSIS — E78.2 MIXED HYPERLIPIDEMIA: ICD-10-CM

## 2023-05-19 DIAGNOSIS — E66.09 CLASS 1 OBESITY DUE TO EXCESS CALORIES WITHOUT SERIOUS COMORBIDITY WITH BODY MASS INDEX (BMI) OF 34.0 TO 34.9 IN ADULT: ICD-10-CM

## 2023-05-19 LAB
ALBUMIN SERPL BCP-MCNC: 3.5 G/DL (ref 3.5–5)
ALP SERPL-CCNC: 62 U/L (ref 46–116)
ALT SERPL W P-5'-P-CCNC: 22 U/L (ref 12–78)
ANION GAP SERPL CALCULATED.3IONS-SCNC: 2 MMOL/L (ref 4–13)
AST SERPL W P-5'-P-CCNC: 13 U/L (ref 5–45)
BILIRUB SERPL-MCNC: 0.26 MG/DL (ref 0.2–1)
BUN SERPL-MCNC: 9 MG/DL (ref 5–25)
CALCIUM SERPL-MCNC: 8.9 MG/DL (ref 8.3–10.1)
CHLORIDE SERPL-SCNC: 110 MMOL/L (ref 96–108)
CHOLEST SERPL-MCNC: 203 MG/DL
CO2 SERPL-SCNC: 26 MMOL/L (ref 21–32)
CREAT SERPL-MCNC: 0.99 MG/DL (ref 0.6–1.3)
ERYTHROCYTE [DISTWIDTH] IN BLOOD BY AUTOMATED COUNT: 13.2 % (ref 11.6–15.1)
EST. AVERAGE GLUCOSE BLD GHB EST-MCNC: 100 MG/DL
GFR SERPL CREATININE-BSD FRML MDRD: 73 ML/MIN/1.73SQ M
GLUCOSE P FAST SERPL-MCNC: 94 MG/DL (ref 65–99)
HBA1C MFR BLD: 5.1 %
HCT VFR BLD AUTO: 40.4 % (ref 34.8–46.1)
HDLC SERPL-MCNC: 67 MG/DL
HGB BLD-MCNC: 12.9 G/DL (ref 11.5–15.4)
LDLC SERPL CALC-MCNC: 114 MG/DL (ref 0–100)
MCH RBC QN AUTO: 30.1 PG (ref 26.8–34.3)
MCHC RBC AUTO-ENTMCNC: 31.9 G/DL (ref 31.4–37.4)
MCV RBC AUTO: 94 FL (ref 82–98)
PLATELET # BLD AUTO: 332 THOUSANDS/UL (ref 149–390)
PMV BLD AUTO: 9.7 FL (ref 8.9–12.7)
POTASSIUM SERPL-SCNC: 3.8 MMOL/L (ref 3.5–5.3)
PROT SERPL-MCNC: 7.4 G/DL (ref 6.4–8.4)
RBC # BLD AUTO: 4.29 MILLION/UL (ref 3.81–5.12)
SODIUM SERPL-SCNC: 138 MMOL/L (ref 135–147)
T4 FREE SERPL-MCNC: 0.88 NG/DL (ref 0.61–1.12)
TRIGL SERPL-MCNC: 108 MG/DL
TSH SERPL DL<=0.05 MIU/L-ACNC: 7.63 UIU/ML (ref 0.45–4.5)
WBC # BLD AUTO: 5.31 THOUSAND/UL (ref 4.31–10.16)

## 2023-05-22 ENCOUNTER — TELEPHONE (OUTPATIENT)
Dept: FAMILY MEDICINE CLINIC | Facility: CLINIC | Age: 38
End: 2023-05-22

## 2023-05-22 DIAGNOSIS — E03.9 ACQUIRED HYPOTHYROIDISM: ICD-10-CM

## 2023-05-22 RX ORDER — LEVOTHYROXINE SODIUM 0.05 MG/1
50 TABLET ORAL DAILY
Qty: 30 TABLET | Refills: 2 | Status: SHIPPED | OUTPATIENT
Start: 2023-05-22

## 2023-05-22 NOTE — TELEPHONE ENCOUNTER
----- Message from Monica Adams MD sent at 5/22/2023  8:18 AM EDT -----  Labs reviewed, TSH is elevated so will increase levothyroxine and recheck TSH in 6-8 weeks, CBC and A1C within normal limits, lipid panel improved, chloride a little high and anion gap low could be lab error, kidney and liver function within normal limits

## 2023-07-19 DIAGNOSIS — E03.9 ACQUIRED HYPOTHYROIDISM: ICD-10-CM

## 2023-07-19 RX ORDER — LEVOTHYROXINE SODIUM 0.05 MG/1
50 TABLET ORAL DAILY
Qty: 90 TABLET | Refills: 0 | Status: SHIPPED | OUTPATIENT
Start: 2023-07-19

## 2023-09-29 DIAGNOSIS — E03.9 ACQUIRED HYPOTHYROIDISM: ICD-10-CM

## 2023-09-29 RX ORDER — LEVOTHYROXINE SODIUM 0.05 MG/1
50 TABLET ORAL EVERY MORNING
Qty: 90 TABLET | Refills: 3 | Status: SHIPPED | OUTPATIENT
Start: 2023-09-29

## 2023-11-15 ENCOUNTER — RA CDI HCC (OUTPATIENT)
Dept: OTHER | Facility: HOSPITAL | Age: 38
End: 2023-11-15

## 2023-11-15 NOTE — PROGRESS NOTES
[address] [facility] coding opportunities          Chart Reviewed number of suggestions sent to Provider: 1   Depression  Patients Insurance        Commercial Insurance: [employer]

## 2024-02-28 DIAGNOSIS — F32.A CHRONIC DEPRESSION: ICD-10-CM

## 2024-02-28 DIAGNOSIS — F41.1 GENERALIZED ANXIETY DISORDER: ICD-10-CM

## 2024-02-28 RX ORDER — VENLAFAXINE HYDROCHLORIDE 150 MG/1
150 CAPSULE, EXTENDED RELEASE ORAL DAILY
Qty: 90 CAPSULE | Refills: 1 | Status: SHIPPED | OUTPATIENT
Start: 2024-02-28

## 2024-02-28 NOTE — TELEPHONE ENCOUNTER
Reason for call:   [x] Refill   [] Prior Auth  [] Other:     Office:   [x] PCP/Provider - Dominique Chairez MD   [] Specialty/Provider -     Medication: venlafaxine (EFFEXOR-XR) 150 mg 24 hr capsule     Dose/Frequency: 150 mg, Oral, Daily     Quantity: 90    Pharmacy:   [id number] - JESSY ELIZONDO - [address]       Does the patient have enough for 3 days?   [x] Yes   [] No - Send as HP to POD

## 2024-03-06 ENCOUNTER — OFFICE VISIT (OUTPATIENT)
Dept: FAMILY MEDICINE CLINIC | Facility: CLINIC | Age: 39
End: 2024-03-06
Payer: COMMERCIAL

## 2024-03-06 VITALS
OXYGEN SATURATION: 99 % | TEMPERATURE: 97.9 F | WEIGHT: 212.8 LBS | DIASTOLIC BLOOD PRESSURE: 74 MMHG | HEIGHT: 66 IN | HEART RATE: 101 BPM | SYSTOLIC BLOOD PRESSURE: 126 MMHG | BODY MASS INDEX: 34.2 KG/M2

## 2024-03-06 DIAGNOSIS — E03.9 ACQUIRED HYPOTHYROIDISM: ICD-10-CM

## 2024-03-06 DIAGNOSIS — E78.2 MIXED HYPERLIPIDEMIA: ICD-10-CM

## 2024-03-06 DIAGNOSIS — R04.0 FREQUENT NOSEBLEEDS: ICD-10-CM

## 2024-03-06 DIAGNOSIS — N18.2 CHRONIC RENAL IMPAIRMENT, STAGE 2 (MILD): ICD-10-CM

## 2024-03-06 DIAGNOSIS — F32.A CHRONIC DEPRESSION: Primary | ICD-10-CM

## 2024-03-06 PROCEDURE — 99395 PREV VISIT EST AGE 18-39: CPT | Performed by: FAMILY MEDICINE

## 2024-03-06 PROCEDURE — 99214 OFFICE O/P EST MOD 30 MIN: CPT | Performed by: FAMILY MEDICINE

## 2024-03-06 NOTE — PROGRESS NOTES
ADULT ANNUAL PHYSICAL  [facility]    NAME: Erum Membreno  AGE: 38 y.o. SEX: female  : 1985     DATE: 3/7/2024     Assessment and Plan:     Problem List Items Addressed This Visit       Acquired hypothyroidism    Chronic renal impairment, stage 2 (mild)    Relevant Orders    Basic metabolic panel    Chronic depression - Primary    Mixed hyperlipidemia    Relevant Orders    Lipid Panel with Direct LDL reflex     Other Visit Diagnoses       Frequent nosebleeds        Relevant Medications    mupirocin (BACTROBAN) 2 % ointment          - Satisfactory physical examination; up to date with cervical cancer screening   - Patient up to date with    - Declines changes in her medication for anxiety and depression and will continue Venlafaxine 150mg daily in conjunction with therapy services.   - She will continue Levothyroxine 25mcg for hypothyroidism and will follow up on TSH which was reordered.   - Repeat lipid panel ordered due to history of dyslipidemia   - BMP to monitor kidney function   - Start treatment with mupirocin and also advised to use nasal sprays to prevent nosebleeds               Return in about 6 months (around 2024) for Next scheduled follow up.     Chief Complaint:     Chief Complaint   Patient presents with    Physical Exam      History of Present Illness:     Adult Annual Physical   Erum Membreno is a pleasant 38 year old female with a past medical history of anxiety, depression, and dyslipidemia who presents today for an annual physical. Patient states that she ended up leaving her last job quite abruptly and as a result did not have health insurance for a while. She states that she is now working at a different place and is back to having insurance. Patient still reports that she is under a significant amount of stress both at work and at home taking care of her mother who had another stroke and a father with terminal cancer. She  reports passive suicidal ideation but is not actively suicidal. Unfortunately due to not having insurance she was unable to see her therapist for quite some time but will be calling to set up another appointment with her. In addition to this patient reports that she has been having daily nosebleeds for the past two years. She reports that it is not copious amount of blood but feels like there is a scab inside her left nostril which she picks at. She does report a humidifier in the house but does not use nasal sprays.    Diet and Physical Activity  Diet/Nutrition: poor diet.   Exercise:  no formal exercise .      Depression Screening  PHQ-2/9 Depression Screening    Little interest or pleasure in doing things: 3 - nearly every day  Feeling down, depressed, or hopeless: 2 - more than half the days  Trouble falling or staying asleep, or sleeping too much: 3 - nearly every day  Feeling tired or having little energy: 3 - nearly every day  Poor appetite or overeating: 3 - nearly every day  Feeling bad about yourself - or that you are a failure or have let yourself or your family down: 2 - more than half the days  Trouble concentrating on things, such as reading the newspaper or watching television: 2 - more than half the days  Moving or speaking so slowly that other people could have noticed. Or the opposite - being so fidgety or restless that you have been moving around a lot more than usual: 0 - not at all  Thoughts that you would be better off dead, or of hurting yourself in some way: 1 - several days  PHQ-9 Score: 19  PHQ-9 Interpretation: Moderately severe depression       General Health  Sleep:  8 hours of interrupted sleep .   Hearing:  Does not require use of hearing aids .  Vision: previous LASIK surgery.   Dental: no dental visits for >1 year.       /GYN Health  Follows with gynecology? yes         Review of Systems:     Review of Systems   Constitutional: Negative.    HENT:  Positive for nosebleeds.    Eyes:  Negative.    Respiratory: Negative.     Cardiovascular: Negative.    Gastrointestinal: Negative.    Genitourinary: Negative.    Musculoskeletal:  Positive for arthralgias and back pain.   Skin: Negative.    Neurological: Negative.    Psychiatric/Behavioral:  Positive for dysphoric mood and suicidal ideas.       Past Medical History:     Past Medical History:   Diagnosis Date    Acquired hypothyroidism 12/30/2019    Anxiety     Depression     last assessed 10/20/17      Past Surgical History:     No past surgical history on file.   Social History:     Social History     Socioeconomic History    Marital status: Single     Spouse name: None    Number of children: None    Years of education: None    Highest education level: None   Occupational History    Occupation: currently works full time   Tobacco Use    Smoking status: Never    Smokeless tobacco: Never   Vaping Use    Vaping status: Never Used   Substance and Sexual Activity    Alcohol use: Yes     Comment: rarely    Drug use: Yes     Types: Marijuana     Comment: quit may 2021    Sexual activity: Not Currently     Birth control/protection: OCP   Other Topics Concern    None   Social History Narrative    Lives with parents     Social Determinants of Health     Financial Resource Strain: Not on file   Food Insecurity: Not on file   Transportation Needs: Not on file   Physical Activity: Not on file   Stress: Not on file   Social Connections: Not on file   Intimate Partner Violence: Not on file   Housing Stability: Not on file      Family History:     Family History   Problem Relation Age of Onset    No Known Problems Family       Current Medications:     Current Outpatient Medications   Medication Sig Dispense Refill    Black Elderberry,Berry-Flower, 575 MG CAPS Take by mouth      BLISOVI FE 1/20 1-20 MG-MCG per tablet TAKE 1 TABLET DAILY 84 tablet 0    clonazePAM (KlonoPIN) 1 mg tablet Take 0.5 tablets (0.5 mg total) by mouth 2 (two) times a day as needed for  "anxiety 30 tablet 0    fexofenadine (ALLEGRA) 180 MG tablet Take 1 tablet by mouth daily as needed      flecainide (TAMBOCOR) 100 mg tablet Take 1 tablet (100 mg total) by mouth as needed (Take 1 tablet if palps 5 min, repeat if palps last > 25 min) 30 tablet 3    fluticasone (FLONASE) 50 mcg/act nasal spray 2 sprays into each nostril daily      gabapentin (Neurontin) 100 mg capsule Take 2 tablet twice a day.po 120 capsule 0    levothyroxine 50 mcg tablet TAKE 1 TABLET DAILY IN THE MORNING 90 tablet 3    metoprolol tartrate (LOPRESSOR) 25 mg tablet Take 1 tablet (25 mg total) by mouth as needed (take 1 tablet as needed for palps lasting 5 min, repeat if palps last > 25 min) 30 tablet 3    multivitamin (THERAGRAN) TABS Take 1 tablet by mouth daily      mupirocin (BACTROBAN) 2 % ointment Apply topically 3 (three) times a day 22 g 0    Olive Leaf Extract 150 MG CAPS Take 3 capsules by mouth daily      oxybutynin (DITROPAN) 5 mg tablet She takes one tab daily 90 tablet 3    venlafaxine (EFFEXOR-XR) 150 mg 24 hr capsule Take 1 capsule (150 mg total) by mouth daily 90 capsule 1     No current facility-administered medications for this visit.      Allergies:     Allergies   Allergen Reactions    Cephalexin Fever      Physical Exam:     /74 (BP Location: Left arm, Patient Position: Sitting, Cuff Size: Standard)   Pulse 101   Temp 97.9 °F (36.6 °C) (Temporal)   Ht 5' 6\" (1.676 m)   Wt 96.5 kg (212 lb 12.8 oz)   SpO2 99%   BMI 34.35 kg/m²     Physical Exam  Constitutional:       General: She is not in acute distress.     Appearance: She is not ill-appearing.   HENT:      Head: Normocephalic and atraumatic.      Nose:      Comments: Scan noted in the left nostril   Eyes:      General:         Right eye: No discharge.         Left eye: No discharge.      Extraocular Movements: Extraocular movements intact.   Cardiovascular:      Rate and Rhythm: Normal rate.   Pulmonary:      Effort: Pulmonary effort is normal. No " respiratory distress.      Breath sounds: No wheezing.   Abdominal:      General: Bowel sounds are normal. There is no distension.      Palpations: Abdomen is soft.      Tenderness: There is no abdominal tenderness.   Musculoskeletal:      Right lower leg: No edema.      Left lower leg: No edema.   Neurological:      General: No focal deficit present.      Mental Status: She is alert.      Motor: No weakness.      Coordination: Coordination normal.      Gait: Gait normal.      Deep Tendon Reflexes: Reflexes normal.   Psychiatric:         Mood and Affect: Mood normal.         Behavior: Behavior normal.          Dominique Chairez MD   [facility]    Depression Screening Follow-up Plan: Patient's depression screening was positive with a PHQ-2 score of . Their PHQ-9 score was 19. Patient assessed for underlying major depression. They have no active suicidal ideations. Brief counseling provided and recommend additional follow-up/re-evaluation next office visit.

## 2024-03-08 ENCOUNTER — APPOINTMENT (OUTPATIENT)
Dept: LAB | Facility: CLINIC | Age: 39
End: 2024-03-08
Payer: COMMERCIAL

## 2024-03-08 DIAGNOSIS — E03.9 ACQUIRED HYPOTHYROIDISM: ICD-10-CM

## 2024-03-08 DIAGNOSIS — N18.2 CHRONIC RENAL IMPAIRMENT, STAGE 2 (MILD): ICD-10-CM

## 2024-03-08 DIAGNOSIS — E78.2 MIXED HYPERLIPIDEMIA: ICD-10-CM

## 2024-03-08 LAB
ANION GAP SERPL CALCULATED.3IONS-SCNC: 7 MMOL/L
BUN SERPL-MCNC: 11 MG/DL (ref 5–25)
CALCIUM SERPL-MCNC: 9.2 MG/DL (ref 8.4–10.2)
CHLORIDE SERPL-SCNC: 104 MMOL/L (ref 96–108)
CHOLEST SERPL-MCNC: 231 MG/DL
CO2 SERPL-SCNC: 27 MMOL/L (ref 21–32)
CREAT SERPL-MCNC: 0.92 MG/DL (ref 0.6–1.3)
GFR SERPL CREATININE-BSD FRML MDRD: 79 ML/MIN/1.73SQ M
GLUCOSE P FAST SERPL-MCNC: 89 MG/DL (ref 65–99)
HDLC SERPL-MCNC: 77 MG/DL
LDLC SERPL CALC-MCNC: 139 MG/DL (ref 0–100)
POTASSIUM SERPL-SCNC: 4.2 MMOL/L (ref 3.5–5.3)
SODIUM SERPL-SCNC: 138 MMOL/L (ref 135–147)
TRIGL SERPL-MCNC: 75 MG/DL
TSH SERPL DL<=0.05 MIU/L-ACNC: 2.37 UIU/ML (ref 0.45–4.5)

## 2024-03-08 PROCEDURE — 80061 LIPID PANEL: CPT

## 2024-03-08 PROCEDURE — 36415 COLL VENOUS BLD VENIPUNCTURE: CPT

## 2024-03-08 PROCEDURE — 84443 ASSAY THYROID STIM HORMONE: CPT

## 2024-03-08 PROCEDURE — 80048 BASIC METABOLIC PNL TOTAL CA: CPT

## 2024-05-21 DIAGNOSIS — F41.1 GENERALIZED ANXIETY DISORDER: ICD-10-CM

## 2024-05-21 DIAGNOSIS — M54.16 LUMBAR RADICULOPATHY: ICD-10-CM

## 2024-05-21 NOTE — TELEPHONE ENCOUNTER
Reason for call:   [x] Refill   [] Prior Auth  [] Other:     Office: [facility]  [x] PCP/Provider - [place]y   [] Specialty/Provider -     Medication: gabapentin, clonazePAM     Dose/Frequency: 100 mg, 1 mg/ 2 caps twice daily, 1/2 tab twice daily PRN     Quantity: 30 day supply     [facility]    Does the patient have enough for 3 days?   [x] Yes   [] No - Send as HP to POD

## 2024-05-22 RX ORDER — GABAPENTIN 100 MG/1
CAPSULE ORAL
Qty: 120 CAPSULE | Refills: 5 | Status: SHIPPED | OUTPATIENT
Start: 2024-05-22

## 2024-05-23 RX ORDER — CLONAZEPAM 1 MG/1
0.5 TABLET ORAL 2 TIMES DAILY PRN
Qty: 30 TABLET | Refills: 0 | Status: SHIPPED | OUTPATIENT
Start: 2024-05-23

## 2024-08-24 DIAGNOSIS — F32.A CHRONIC DEPRESSION: ICD-10-CM

## 2024-08-24 DIAGNOSIS — F41.1 GENERALIZED ANXIETY DISORDER: ICD-10-CM

## 2024-08-25 RX ORDER — VENLAFAXINE HYDROCHLORIDE 150 MG/1
150 CAPSULE, EXTENDED RELEASE ORAL DAILY
Qty: 90 CAPSULE | Refills: 1 | Status: SHIPPED | OUTPATIENT
Start: 2024-08-25

## 2024-09-11 ENCOUNTER — OFFICE VISIT (OUTPATIENT)
Dept: FAMILY MEDICINE CLINIC | Facility: CLINIC | Age: 39
End: 2024-09-11
Payer: COMMERCIAL

## 2024-09-11 VITALS
BODY MASS INDEX: 34.39 KG/M2 | DIASTOLIC BLOOD PRESSURE: 80 MMHG | SYSTOLIC BLOOD PRESSURE: 126 MMHG | OXYGEN SATURATION: 99 % | WEIGHT: 214 LBS | HEIGHT: 66 IN | HEART RATE: 110 BPM

## 2024-09-11 DIAGNOSIS — F41.1 GENERALIZED ANXIETY DISORDER: ICD-10-CM

## 2024-09-11 DIAGNOSIS — F32.A CHRONIC DEPRESSION: Primary | ICD-10-CM

## 2024-09-11 DIAGNOSIS — E66.09 CLASS 1 OBESITY DUE TO EXCESS CALORIES WITHOUT SERIOUS COMORBIDITY WITH BODY MASS INDEX (BMI) OF 34.0 TO 34.9 IN ADULT: ICD-10-CM

## 2024-09-11 DIAGNOSIS — E03.9 ACQUIRED HYPOTHYROIDISM: ICD-10-CM

## 2024-09-11 DIAGNOSIS — E78.2 MIXED HYPERLIPIDEMIA: ICD-10-CM

## 2024-09-11 DIAGNOSIS — R45.851 PASSIVE SUICIDAL IDEATIONS: ICD-10-CM

## 2024-09-11 PROBLEM — R61 EXCESSIVE SWEATING: Status: RESOLVED | Noted: 2021-07-16 | Resolved: 2024-09-11

## 2024-09-11 PROCEDURE — 99214 OFFICE O/P EST MOD 30 MIN: CPT | Performed by: FAMILY MEDICINE

## 2024-09-11 NOTE — PROGRESS NOTES
Ambulatory Visit  Name: Erum Membreno      : 1985      MRN: [id number]  Encounter Provider: Dominique Chairez MD  Encounter Date: 2024   Encounter department: [facility]    Assessment & Plan  Chronic depression  Depression Screening Follow-up Plan: Patient's depression screening was positive with a PHQ-9 score of 12. Patient assessed for underlying major depression. They have no active suicidal ideations. Brief counseling provided and recommend additional follow-up/re-evaluation next office visit.  Patient was previously following with a therapist but has been unable to reconnect with them. She is interested in establishing care elsewhere. Referrals to psychiatry and social work placed. Continue Venlafaxine 150mg daily and Klonopin as needed.  Orders:    Ambulatory referral to Psych Services; Future    Ambulatory Referral to Social Work Care Management Program; Future    Generalized anxiety disorder  Management as above  Orders:    Ambulatory referral to Psych Services; Future    Ambulatory Referral to Social Work Care Management Program; Future    Passive suicidal ideations    Orders:    Ambulatory referral to Psych Services; Future    Ambulatory Referral to Social Work Care Management Program; Future    Acquired hypothyroidism  Continue levothyroxine 50mcg daily; repeat TSH ordered  Orders:    TSH, 3rd generation; Future    Class 1 obesity due to excess calories without serious comorbidity with body mass index (BMI) of 34.0 to 34.9 in adult  BMI Counseling: Body mass index is 34.54 kg/m². The BMI is above normal. Nutrition recommendations include 3-5 servings of fruits/vegetables daily, moderation in carbohydrate intake, increasing intake of lean protein, and reducing intake of saturated fat and trans fat. Exercise recommendations include moderate aerobic physical activity for 150 minutes/week. Patient referred to weight management due to patient being obese.   Orders:    Ambulatory  "Referral to Weight Management; Future    CBC and Platelet; Future    Comprehensive metabolic panel; Future    Hemoglobin A1C; Future    Mixed hyperlipidemia  Managing with diet and lifestyle modifications   Orders:    Lipid Panel with Direct LDL reflex; Future       History of Present Illness     Erum Membreno is a very pleasant 39 year old female who presents today for a follow up. Patient reports that she is still under a lot of stress at work and taking care of her parents. She has been unable to connect with her previous therapist but thinks that seeing psychiatry would be beneficial. She still has passive suicidal ideation but reports that she is not actively suicidal. She is still struggling with weight loss. She has been trying to adopt a healthier diet although exercise is difficult for her given her history of plantar fascitis. Her brother is following [facility] at [facility] and is seeing excellent results so she would like to try this as well.       Review of Systems   Constitutional: Negative.    HENT: Negative.     Eyes: Negative.    Respiratory: Negative.     Cardiovascular: Negative.    Gastrointestinal: Negative.    Genitourinary: Negative.    Musculoskeletal: Negative.    Skin: Negative.    Neurological: Negative.    Psychiatric/Behavioral: Negative.             Objective     /80 (BP Location: Left arm, Patient Position: Sitting, Cuff Size: Adult)   Pulse (!) 110   Ht 5' 6\" (1.676 m)   Wt 97.1 kg (214 lb)   SpO2 99%   BMI 34.54 kg/m²     Physical Exam  Constitutional:       General: She is not in acute distress.     Appearance: She is not ill-appearing.   HENT:      Head: Normocephalic and atraumatic.   Eyes:      General:         Right eye: No discharge.         Left eye: No discharge.      Extraocular Movements: Extraocular movements intact.   Cardiovascular:      Rate and Rhythm: Normal rate.   Pulmonary:      Effort: Pulmonary effort is normal. No respiratory distress. "      Breath sounds: No wheezing.   Neurological:      General: No focal deficit present.      Mental Status: She is alert.   Psychiatric:         Mood and Affect: Mood normal.         Behavior: Behavior normal.

## 2024-09-11 NOTE — ASSESSMENT & PLAN NOTE
Management as above  Orders:    Ambulatory referral to Psych Services; Future    Ambulatory Referral to Social Work Care Management Program; Future

## 2024-09-11 NOTE — ASSESSMENT & PLAN NOTE
Managing with diet and lifestyle modifications   Orders:    Lipid Panel with Direct LDL reflex; Future

## 2024-09-11 NOTE — ASSESSMENT & PLAN NOTE
BMI Counseling: Body mass index is 34.54 kg/m². The BMI is above normal. Nutrition recommendations include 3-5 servings of fruits/vegetables daily, moderation in carbohydrate intake, increasing intake of lean protein, and reducing intake of saturated fat and trans fat. Exercise recommendations include moderate aerobic physical activity for 150 minutes/week. Patient referred to weight management due to patient being obese.   Orders:    Ambulatory Referral to Weight Management; Future    CBC and Platelet; Future    Comprehensive metabolic panel; Future    Hemoglobin A1C; Future

## 2024-09-11 NOTE — ASSESSMENT & PLAN NOTE
Depression Screening Follow-up Plan: Patient's depression screening was positive with a PHQ-9 score of 12. Patient assessed for underlying major depression. They have no active suicidal ideations. Brief counseling provided and recommend additional follow-up/re-evaluation next office visit.  Patient was previously following with a therapist but has been unable to reconnect with them. She is interested in establishing care elsewhere. Referrals to psychiatry and social work placed. Continue Venlafaxine 150mg daily and Klonopin as needed.  Orders:    Ambulatory referral to Psych Services; Future    Ambulatory Referral to Social Work Care Management Program; Future

## 2024-09-12 ENCOUNTER — PATIENT OUTREACH (OUTPATIENT)
Dept: CASE MANAGEMENT | Facility: OTHER | Age: 39
End: 2024-09-12

## 2024-09-12 ENCOUNTER — TELEPHONE (OUTPATIENT)
Age: 39
End: 2024-09-12

## 2024-09-12 NOTE — PROGRESS NOTES
BRITTANY SHABAZZ received referral to provide support regarding outpatient mental health services.  Call to patient however unable to leave message as [facility] is full.  Will continue attempts to contact patient to offer support.

## 2024-09-12 NOTE — TELEPHONE ENCOUNTER
Contacted patient in regards to ASAP/STAT Referal in attempts to verify patient's needs of services and schedule soonest available appointment. IC unable to lvm due to vm box being full.    Attempt #1

## 2024-09-13 NOTE — TELEPHONE ENCOUNTER
ASAP pt placed on the TT wait list as high priority due to no openings available.       Patient has been added to the [facility] wait list with a referral.    Insurance: [employer]  Insurance Type:    Commercial [x]   Medicaid []   [facility] (if applicable)   Medicare []  Location Preference: Anywhere  Provider Preference: none  Virtual: Yes [] No [x]  Were outside resources sent: Yes [x] No []    Presenting Problem  Depression   Anxiety   Potential Undiagnosed Autism

## 2024-09-16 ENCOUNTER — PATIENT OUTREACH (OUTPATIENT)
Dept: CASE MANAGEMENT | Facility: OTHER | Age: 39
End: 2024-09-16
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
## 2024-11-11 DIAGNOSIS — R61 EXCESSIVE SWEATING: ICD-10-CM

## 2024-11-11 DIAGNOSIS — E03.9 ACQUIRED HYPOTHYROIDISM: ICD-10-CM

## 2024-11-11 RX ORDER — LEVOTHYROXINE SODIUM 50 UG/1
50 TABLET ORAL DAILY
Qty: 90 TABLET | Refills: 1 | Status: SHIPPED | OUTPATIENT
Start: 2024-11-11

## 2024-11-11 RX ORDER — OXYBUTYNIN CHLORIDE 5 MG/1
TABLET ORAL
Qty: 90 TABLET | Refills: 1 | Status: SHIPPED | OUTPATIENT
Start: 2024-11-11

## 2024-11-11 NOTE — TELEPHONE ENCOUNTER
Reason for call:   [x] Refill   [] Prior Auth  [] Other:     Office:   [x] PCP/Provider - Dr Chairez   [] Specialty/Provider -     Medication: levothyroxine 50 mcg every morning      Oxybutynin 5 mg take daily         Quantity: 90    Pharmacy: [facility]     Does the patient have enough for 3 days?   [] Yes   [x] No - Send as HP to POD

## 2024-12-23 DIAGNOSIS — M54.16 LUMBAR RADICULOPATHY: ICD-10-CM

## 2024-12-23 NOTE — TELEPHONE ENCOUNTER
Reason for call:   [x] Refill   [] Prior Auth  [] Other:     Office:   [x] PCP/Provider - Dominique Chairez MD   [] Specialty/Provider -     Medication: gabapentin (Neurontin) 100 mg capsule / Take 2 tablet twice a day.po    Pharmacy: [facility]/pharm[facility] #[id number] - [place] - [phone]     Does the patient have enough for 3 days?   [x] Yes   [] No - Send as HP to POD

## 2024-12-24 RX ORDER — GABAPENTIN 100 MG/1
CAPSULE ORAL
Qty: 120 CAPSULE | Refills: 5 | Status: SHIPPED | OUTPATIENT
Start: 2024-12-24

## 2025-03-12 ENCOUNTER — OFFICE VISIT (OUTPATIENT)
Dept: FAMILY MEDICINE CLINIC | Facility: CLINIC | Age: 40
End: 2025-03-12
Payer: COMMERCIAL

## 2025-03-12 VITALS
DIASTOLIC BLOOD PRESSURE: 88 MMHG | OXYGEN SATURATION: 99 % | SYSTOLIC BLOOD PRESSURE: 122 MMHG | WEIGHT: 218 LBS | BODY MASS INDEX: 35.03 KG/M2 | HEART RATE: 89 BPM | HEIGHT: 66 IN | TEMPERATURE: 97.4 F

## 2025-03-12 DIAGNOSIS — G89.29 CHRONIC RIGHT-SIDED LOW BACK PAIN WITH RIGHT-SIDED SCIATICA: ICD-10-CM

## 2025-03-12 DIAGNOSIS — Z00.00 ANNUAL PHYSICAL EXAM: Primary | ICD-10-CM

## 2025-03-12 DIAGNOSIS — F32.2 SEVERE MAJOR DEPRESSIVE DISORDER (HCC): ICD-10-CM

## 2025-03-12 DIAGNOSIS — E66.09 CLASS 1 OBESITY DUE TO EXCESS CALORIES WITHOUT SERIOUS COMORBIDITY WITH BODY MASS INDEX (BMI) OF 34.0 TO 34.9 IN ADULT: ICD-10-CM

## 2025-03-12 DIAGNOSIS — R63.5 WEIGHT GAIN: ICD-10-CM

## 2025-03-12 DIAGNOSIS — E66.811 CLASS 1 OBESITY DUE TO EXCESS CALORIES WITHOUT SERIOUS COMORBIDITY WITH BODY MASS INDEX (BMI) OF 34.0 TO 34.9 IN ADULT: ICD-10-CM

## 2025-03-12 DIAGNOSIS — M54.41 CHRONIC RIGHT-SIDED LOW BACK PAIN WITH RIGHT-SIDED SCIATICA: ICD-10-CM

## 2025-03-12 PROCEDURE — 99214 OFFICE O/P EST MOD 30 MIN: CPT | Performed by: FAMILY MEDICINE

## 2025-03-12 PROCEDURE — 99395 PREV VISIT EST AGE 18-39: CPT | Performed by: FAMILY MEDICINE

## 2025-03-12 RX ORDER — PREGABALIN 50 MG/1
50 CAPSULE ORAL 2 TIMES DAILY
Qty: 60 CAPSULE | Refills: 1 | Status: SHIPPED | OUTPATIENT
Start: 2025-03-12

## 2025-03-12 NOTE — PROGRESS NOTES
Adult Annual Physical  Name: Erum Membreno      : 1985      MRN: [id number]  Encounter Provider: Dominique Chairez MD  Encounter Date: 3/12/2025   Encounter department: [facility]    Assessment & Plan  Annual physical exam  Satisfactory physical examination   Immunizations up to date   Return to office for sebaceous cyst removal       Severe major depressive disorder (HCC)  - Currently on Effexor 150mg; patient had been contacted by social work and given a list of mental health providers although has yet to contact them regarding appointments         Chronic right-sided low back pain with right-sided sciatica  No improvement with Gabapentin; start trial of lyrica. Patient advised to get low back XR and do low back exercises at home  Follow up in 4 weeks   Orders:    XR spine lumbar minimum 4 views non injury; Future    pregabalin (LYRICA) 50 mg capsule; Take 1 capsule (50 mg total) by mouth 2 (two) times a day    Weight gain    Orders:    Cortisol Level,7-9 AM Specimen; Future    Class 1 obesity due to excess calories without serious comorbidity with body mass index (BMI) of 34.0 to 34.9 in adult    Orders:    Ambulatory Referral to Weight Management; Future    Preventive Screenings:    - Prostate cancer screening: screening not indicated     Immunizations:  - Immunizations due: Influenza and Tdap         History of Present Illness   Erum Membreno is a very pleasant 39 year old female who presents today for an annual physical and to discuss recent lab testing. Her main concern today is regarding her chronic low back pain. She has been seeing for chiropractor once a month for adjustments which helps at the time however the next day the pain returns. She has done physical therapy twice and has been doing home exercises although not consistently. The pain does go down the right leg. She been taking  Gabapentin 400mg once a day with little improvement.      Patient also thinks she has developed  "lactose intolerance and has been adhering to a lactate free diet. She reports that there is a dietician at work that she wants to start seeing about this.     She also has what she believes to be a sebaceous cyst on her upper back which drains cheesy material that she would like to have removed.     Adult Annual Physical:  Patient presents for annual physical.     Diet and Physical Activity:  - Diet/Nutrition:. Lactose free diet    General Health:    - Vision:. Regular eye exam  - Dental: regular dental visits.    /GYN Health:  - Follows with GYN: yes.   - Menopause: premenopausal.     Review of Systems   Constitutional: Negative.    HENT: Negative.     Eyes: Negative.    Respiratory: Negative.     Cardiovascular: Negative.    Gastrointestinal: Negative.    Genitourinary: Negative.    Musculoskeletal:  Positive for back pain.   Skin: Negative.    Neurological: Negative.    Psychiatric/Behavioral: Negative.           Objective   /88 (BP Location: Left arm, Patient Position: Sitting, Cuff Size: Large)   Pulse 89   Temp (!) 97.4 °F (36.3 °C) (Temporal)   Ht 5' 6\" (1.676 m)   Wt 98.9 kg (218 lb)   LMP 02/11/2025   SpO2 99%   BMI 35.19 kg/m²     Physical Exam  Constitutional:       General: She is not in acute distress.     Appearance: She is not ill-appearing.   HENT:      Head: Normocephalic and atraumatic.   Eyes:      General:         Right eye: No discharge.         Left eye: No discharge.      Extraocular Movements: Extraocular movements intact.   Cardiovascular:      Rate and Rhythm: Normal rate.   Pulmonary:      Effort: Pulmonary effort is normal. No respiratory distress.      Breath sounds: No wheezing.   Abdominal:      General: Bowel sounds are normal. There is no distension.      Palpations: Abdomen is soft.      Tenderness: There is no abdominal tenderness.   Musculoskeletal:      Lumbar back: Tenderness and bony tenderness present. Normal range of motion. Negative right straight leg raise " test and negative left straight leg raise test.   Neurological:      General: No focal deficit present.      Mental Status: She is alert.      Motor: No weakness.      Coordination: Coordination normal.      Gait: Gait normal.      Deep Tendon Reflexes: Reflexes normal.   Psychiatric:         Mood and Affect: Mood normal.         Behavior: Behavior normal.

## 2025-03-12 NOTE — PATIENT INSTRUCTIONS
"Patient Education     Back exercises   The Basics   Written by the doctors and editors at [facility]   Why do I need to do back exercises? -- Back exercises can help ease back pain and might help prevent future back pain. Long term, it is important to strengthen the muscles in your lower back, buttocks, and belly. These are your \"core muscles.\" Stretching exercises are also important to keep your muscles flexible.  Below are some stretching and strengthening exercises that might help you. Other forms of movement can help ease or prevent back pain, too. For example, some people like to walk, do aerobic exercise, or do yoga or ben chi. The most important thing is to move your body. Your doctor, nurse, or physical therapist can help you find different types of activity that work for you.  What stretching exercises should I do? -- Below are some examples of stretching exercises. Warm up your muscles before stretching to help prevent injury. To warm up, you can walk, jog, or cycle for a few minutes.  Start by repeating each of these stretches 2 to 3 times. Try to hold each stretch for 5 to 10 seconds, and try to do the stretches 2 to 3 times each day. Breathe slowly and deeply as you do the exercises. Never bounce when doing stretches.   Cat-cow stretch (figure 1) - Start on all fours on the floor, with your hands under your shoulders, knees under your hips, and your back flat. First, tuck your chin and tighten your stomach muscles as you round your back (like a \"cat\"). Hold the stretch for about 10 seconds. Rest for a few seconds as you flatten your back. Next, lift your chin and let your belly and lower back sink toward the floor (like a \"cow\"). Hold the stretch for about 10 seconds.   Single knee-to-chest stretches (figure 2) ? While lying on your back, bend your knees with your feet flat on the floor. Pull 1 knee toward your chest until you feel a stretch in your lower back and buttock area. Lower, and repeat with the " other knee. If you have knee problems, pull your knee up by grabbing the back of your thigh instead of the front of your knee. You can also do this exercise by grabbing both knees at the same time.   Lower trunk rotations (figure 3) ? While lying on your back, bend your knees with your feet flat on the floor. Keep your knees and ankles together, and then drop them to 1 side. Keep both of your shoulders touching the floor until you feel a stretch in the muscles at the side of the back. Repeat on the other side.   Lower back stretches seated (figure 4) ? Sit in a chair with your feet spread about shoulder width apart. Then, lean forward until you feel a stretch in your lower back.  What strengthening exercises should I do? -- Below are some examples of strengthening exercises.  Start by doing each exercise 2 to 3 times. Work up to doing each exercise 10 times. Hold each exercise for 3 to 5 seconds, and try to do the exercises 2 to 3 times each day. Do all exercises slowly.   Shoulder blade squeezes (figure 5) ? Pinch your shoulder blades together on your upper back, and hold 3 to 5 seconds. You can also do these 1 side at a time. Sit with good posture, and make sure that your shoulders do not rise up when you do this exercise. Relax.   Pelvic tilts (figure 6) ? Lie on your back with your knees bent and feet flat on the floor. Tighten your stomach muscles, and press your lower back down to the floor. Relax. You should be able to breathe comfortably during this exercise.   Hip lifts (figure 7) ? Lie on your back with your knees bent and feet flat on the floor. Tighten your stomach muscles, keep your back flat, and lift your buttocks off of the floor. Relax. You should feel this in your buttocks, not in your lower back.  What else should I know?    Exercise, including stretching, might be slightly uncomfortable. But you should not have sharp or severe pain. If you do get severe pain, stop what you are doing. If severe  "pain continues, call your doctor or nurse.   Do not hold your breath when exercising. If you tend to hold your breath, try counting out loud when exercising. If any exercise bothers you, stop right away.   Always warm up before exercising. Warm muscles stretch much easier than cool muscles. Stretching cool muscles can lead to injury.   Doing exercises before a meal can be a good way to get into a routine.  All topics are updated as new evidence becomes available and our peer review process is complete.  This topic retrieved from [employer] on: Apr 03, 2024.  Topic 528491 Version 2.0  Release: 32.2.4 - C32.92  © 2024 UpToDate, Inc. and/or its affiliates. All rights reserved.  figure 1: Cat-cow stretch     Start on all fours on the floor, with hands under your shoulders, knees under your hips, and your back flat. First, tuck your chin and tighten your stomach muscles as you round your back (like a \"cat\"). Hold the stretch for about 10 seconds. Rest for a few seconds as you flatten your back. Next, lift your chin and let your belly and lower back sink toward the floor (like a \"cow\"). Hold the stretch for about 10 seconds.  Graphic [id number] Version 1.0  figure 2: Single knee-to-chest stretch     Lie on your back, bend your knees, and have your feet flat on the floor. Pull 1 knee toward your chest until you feel a stretch in your lower back and buttock area. Repeat with the other knee. If you have knee problems, pull your knee up by grabbing the back of your thigh instead of the front of your knee. You can also do this exercise by grabbing both knees at the same time.  Graphic [id number] Version 1.0  figure 3: Lower trunk rotation     While lying on your back, bend your knees and have your feet flat on the floor. Keep your legs together and then drop them to 1 side. Keep both of your shoulders touching the floor until you feel a stretch in the muscles at the side of the back. Repeat on the other side.  Graphic [id number] Version " 1.0  figure 4: Lower back stretch     Sit in a chair with your feet spread about shoulder width apart. Then, lean forward until you feel a stretch in your lower back.  Graphic [id number] Version 1.0  figure 5: Shoulder blade squeezes     Pinch your shoulder blades together on your upper back and hold for 3 to 5 seconds. Make sure that you are sitting with good posture and that your shoulders do not raise up when you do this exercise. Relax.  Graphic [id number] Version 1.0  figure 6: Pelvic tilts     Lie on your back with your knees bent and feet flat on the floor. Tighten your stomach muscles and press your lower back down to the floor. Relax.  Graphic [id number] Version 1.0  figure 7: Hip lifts     Lie on your back with your knees bent and feet flat on the floor. Tighten your stomach muscles and lift your buttocks off of the floor. Relax.  Graphic [id number] Version 1.0  Consumer Information Use and Disclaimer   Disclaimer: This generalized information is a limited summary of diagnosis, treatment, and/or medication information. It is not meant to be comprehensive and should be used as a tool to help the user understand and/or assess potential diagnostic and treatment options. It does NOT include all information about conditions, treatments, medications, side effects, or risks that may apply to a specific patient. It is not intended to be medical advice or a substitute for the medical advice, diagnosis, or treatment of a health care provider based on the health care provider's examination and assessment of a patient's specific and unique circumstances. Patients must speak with a health care provider for complete information about their health, medical questions, and treatment options, including any risks or benefits regarding use of medications. This information does not endorse any treatments or medications as safe, effective, or approved for treating a specific patient. UpToDate, Inc. and its affiliates disclaim any warranty or  liability relating to this information or the use thereof.The use of this information is governed by the Terms of Use, available at https://www.wolterskluwer.com/en/know/clinical-effectiveness-terms. 2024© UpToDate, Inc. and its affiliates and/or licensors. All rights reserved.  Copyright   © 2024 [employer] and/or its affiliates. All rights reserved.

## 2025-03-12 NOTE — ASSESSMENT & PLAN NOTE
- Currently on Effexor 150mg; patient had been contacted by social work and given a list of mental health providers although has yet to contact them regarding appointments

## 2025-03-15 ENCOUNTER — APPOINTMENT (OUTPATIENT)
Dept: LAB | Facility: MEDICAL CENTER | Age: 40
End: 2025-03-15
Payer: COMMERCIAL

## 2025-03-15 ENCOUNTER — APPOINTMENT (OUTPATIENT)
Dept: RADIOLOGY | Facility: MEDICAL CENTER | Age: 40
End: 2025-03-15
Payer: COMMERCIAL

## 2025-03-15 DIAGNOSIS — E03.9 ACQUIRED HYPOTHYROIDISM: ICD-10-CM

## 2025-03-15 DIAGNOSIS — M54.41 CHRONIC RIGHT-SIDED LOW BACK PAIN WITH RIGHT-SIDED SCIATICA: ICD-10-CM

## 2025-03-15 DIAGNOSIS — E66.09 CLASS 1 OBESITY DUE TO EXCESS CALORIES WITHOUT SERIOUS COMORBIDITY WITH BODY MASS INDEX (BMI) OF 34.0 TO 34.9 IN ADULT: ICD-10-CM

## 2025-03-15 DIAGNOSIS — R63.5 WEIGHT GAIN: ICD-10-CM

## 2025-03-15 DIAGNOSIS — G89.29 CHRONIC RIGHT-SIDED LOW BACK PAIN WITH RIGHT-SIDED SCIATICA: ICD-10-CM

## 2025-03-15 DIAGNOSIS — E66.811 CLASS 1 OBESITY DUE TO EXCESS CALORIES WITHOUT SERIOUS COMORBIDITY WITH BODY MASS INDEX (BMI) OF 34.0 TO 34.9 IN ADULT: ICD-10-CM

## 2025-03-15 DIAGNOSIS — E78.2 MIXED HYPERLIPIDEMIA: ICD-10-CM

## 2025-03-15 LAB
ALBUMIN SERPL BCG-MCNC: 4.2 G/DL (ref 3.5–5)
ALP SERPL-CCNC: 54 U/L (ref 34–104)
ALT SERPL W P-5'-P-CCNC: 15 U/L (ref 7–52)
ANION GAP SERPL CALCULATED.3IONS-SCNC: 10 MMOL/L (ref 4–13)
AST SERPL W P-5'-P-CCNC: 14 U/L (ref 13–39)
BILIRUB SERPL-MCNC: 0.47 MG/DL (ref 0.2–1)
BUN SERPL-MCNC: 16 MG/DL (ref 5–25)
CALCIUM SERPL-MCNC: 9.5 MG/DL (ref 8.4–10.2)
CHLORIDE SERPL-SCNC: 104 MMOL/L (ref 96–108)
CHOLEST SERPL-MCNC: 226 MG/DL (ref ?–200)
CO2 SERPL-SCNC: 25 MMOL/L (ref 21–32)
CORTIS AM PEAK SERPL-MCNC: 12.7 UG/DL (ref 6.7–22.6)
CREAT SERPL-MCNC: 0.96 MG/DL (ref 0.6–1.3)
ERYTHROCYTE [DISTWIDTH] IN BLOOD BY AUTOMATED COUNT: 12.9 % (ref 11.6–15.1)
EST. AVERAGE GLUCOSE BLD GHB EST-MCNC: 114 MG/DL
GFR SERPL CREATININE-BSD FRML MDRD: 75 ML/MIN/1.73SQ M
GLUCOSE P FAST SERPL-MCNC: 82 MG/DL (ref 65–99)
HBA1C MFR BLD: 5.6 %
HCT VFR BLD AUTO: 42.7 % (ref 34.8–46.1)
HDLC SERPL-MCNC: 65 MG/DL
HGB BLD-MCNC: 13.7 G/DL (ref 11.5–15.4)
LDLC SERPL CALC-MCNC: 135 MG/DL (ref 0–100)
MCH RBC QN AUTO: 30.2 PG (ref 26.8–34.3)
MCHC RBC AUTO-ENTMCNC: 32.1 G/DL (ref 31.4–37.4)
MCV RBC AUTO: 94 FL (ref 82–98)
PLATELET # BLD AUTO: 354 THOUSANDS/UL (ref 149–390)
PMV BLD AUTO: 9.8 FL (ref 8.9–12.7)
POTASSIUM SERPL-SCNC: 4.1 MMOL/L (ref 3.5–5.3)
PROT SERPL-MCNC: 7.3 G/DL (ref 6.4–8.4)
RBC # BLD AUTO: 4.54 MILLION/UL (ref 3.81–5.12)
SODIUM SERPL-SCNC: 139 MMOL/L (ref 135–147)
TRIGL SERPL-MCNC: 132 MG/DL (ref ?–150)
TSH SERPL DL<=0.05 MIU/L-ACNC: 2.55 UIU/ML (ref 0.45–4.5)
WBC # BLD AUTO: 5.18 THOUSAND/UL (ref 4.31–10.16)

## 2025-03-15 PROCEDURE — 82533 TOTAL CORTISOL: CPT

## 2025-03-15 PROCEDURE — 80053 COMPREHEN METABOLIC PANEL: CPT

## 2025-03-15 PROCEDURE — 80061 LIPID PANEL: CPT

## 2025-03-15 PROCEDURE — 84443 ASSAY THYROID STIM HORMONE: CPT

## 2025-03-15 PROCEDURE — 83036 HEMOGLOBIN GLYCOSYLATED A1C: CPT

## 2025-03-15 PROCEDURE — 85027 COMPLETE CBC AUTOMATED: CPT

## 2025-03-15 PROCEDURE — 36415 COLL VENOUS BLD VENIPUNCTURE: CPT

## 2025-03-15 PROCEDURE — 72110 X-RAY EXAM L-2 SPINE 4/>VWS: CPT

## 2025-03-18 ENCOUNTER — RESULTS FOLLOW-UP (OUTPATIENT)
Dept: FAMILY MEDICINE CLINIC | Facility: CLINIC | Age: 40
End: 2025-03-18

## 2025-03-25 DIAGNOSIS — F32.A CHRONIC DEPRESSION: ICD-10-CM

## 2025-03-25 DIAGNOSIS — F41.1 GENERALIZED ANXIETY DISORDER: ICD-10-CM

## 2025-03-25 RX ORDER — VENLAFAXINE HYDROCHLORIDE 150 MG/1
150 CAPSULE, EXTENDED RELEASE ORAL DAILY
Qty: 90 CAPSULE | Refills: 1 | Status: SHIPPED | OUTPATIENT
Start: 2025-03-25

## 2025-03-25 NOTE — TELEPHONE ENCOUNTER
Reason for call:   [x] Refill   [] Prior Auth  [] Other:     Office:   [x] PCP/Provider - [place]  [] Specialty/Provider -     Medication:   Venlafaxine 150 mg, 1 qd, 90    Pharmacy:   [facility]   Does the patient have enough for 3 days?   [] Yes   [x] No - Send as HP to POD    [facility]   Does the patient have enough for 10 days?   [] Yes   [] No - Send as HP to POD

## 2025-04-08 ENCOUNTER — OFFICE VISIT (OUTPATIENT)
Dept: FAMILY MEDICINE CLINIC | Facility: CLINIC | Age: 40
End: 2025-04-08
Payer: COMMERCIAL

## 2025-04-08 VITALS
HEART RATE: 80 BPM | WEIGHT: 217.8 LBS | SYSTOLIC BLOOD PRESSURE: 124 MMHG | OXYGEN SATURATION: 96 % | RESPIRATION RATE: 16 BRPM | BODY MASS INDEX: 35 KG/M2 | TEMPERATURE: 100 F | DIASTOLIC BLOOD PRESSURE: 68 MMHG | HEIGHT: 66 IN

## 2025-04-08 DIAGNOSIS — G89.29 CHRONIC RIGHT-SIDED LOW BACK PAIN WITH RIGHT-SIDED SCIATICA: Primary | ICD-10-CM

## 2025-04-08 DIAGNOSIS — M54.41 CHRONIC RIGHT-SIDED LOW BACK PAIN WITH RIGHT-SIDED SCIATICA: Primary | ICD-10-CM

## 2025-04-08 DIAGNOSIS — R42 LIGHTHEADEDNESS: ICD-10-CM

## 2025-04-08 DIAGNOSIS — R00.2 PALPITATIONS: ICD-10-CM

## 2025-04-08 DIAGNOSIS — L72.3 SEBACEOUS CYST: ICD-10-CM

## 2025-04-08 PROCEDURE — 99214 OFFICE O/P EST MOD 30 MIN: CPT | Performed by: FAMILY MEDICINE

## 2025-04-08 RX ORDER — PERFLUOROHEXYLOCTANE 1 MG/MG
SOLUTION OPHTHALMIC
COMMUNITY
Start: 2025-03-18

## 2025-04-08 RX ORDER — METOPROLOL TARTRATE 25 MG/1
25 TABLET, FILM COATED ORAL AS NEEDED
Qty: 30 TABLET | Refills: 3 | Status: SHIPPED | OUTPATIENT
Start: 2025-04-08

## 2025-04-08 RX ORDER — FLECAINIDE ACETATE 100 MG/1
100 TABLET ORAL AS NEEDED
Qty: 30 TABLET | Refills: 3 | Status: SHIPPED | OUTPATIENT
Start: 2025-04-08

## 2025-04-08 RX ORDER — PREGABALIN 50 MG/1
50 CAPSULE ORAL 3 TIMES DAILY
Qty: 270 CAPSULE | Refills: 1 | Status: SHIPPED | OUTPATIENT
Start: 2025-04-08

## 2025-04-08 NOTE — PROGRESS NOTES
Name: Erum Membreno      : 1985      MRN: [id number]  Encounter Provider: Dominique Chairez MD  Encounter Date: 2025   Encounter department: [facility]  :  Assessment & Plan  Chronic right-sided low back pain with right-sided sciatica  Continue with Lyrica 50mg TID in conjunction with exercises and seeing chiropractic medicine   Orders:    pregabalin (LYRICA) 50 mg capsule; Take 1 capsule (50 mg total) by mouth 3 (three) times a day    Sebaceous cyst  Removal attempted today however after injection of lidocaine patient started to feel lightheaded and stated that she could feel her blood pressure dropping. At that point the procedure was aborted. Blood sugars and vitals were checked and patient provided with juice and crackers. Will place referral to General surgery for removal.   Orders:    Ambulatory Referral to General Surgery; Future    Lightheadedness    Orders:    Fingerstick Glucose (POCT)    Palpitations    Orders:    flecainide (TAMBOCOR) 100 mg tablet; Take 1 tablet (100 mg total) by mouth as needed (Take 1 tablet if palps 5 min, repeat if palps last > 25 min)    metoprolol tartrate (LOPRESSOR) 25 mg tablet; Take 1 tablet (25 mg total) by mouth as needed (take 1 tablet as needed for palps lasting 5 min, repeat if palps last > 25 min)           History of Present Illness   HPI    Erum Membreno is a very pleasant 39 year old female who presents today for a follow up. Since starting lyrica she does report some improvement in her low back pain. She has also been working with her chiropractor as well and doing home physical therapy sessions.   She also has a sebaceous cyst on her back which she would like to have removed.     Review of Systems   Constitutional: Negative.    HENT: Negative.     Eyes: Negative.    Respiratory: Negative.     Cardiovascular: Negative.    Gastrointestinal: Negative.    Genitourinary: Negative.    Musculoskeletal:  Positive for back pain.   Skin:  "Negative.    Neurological: Negative.    Psychiatric/Behavioral: Negative.         Objective   /88 (BP Location: Left arm, Patient Position: Sitting, Cuff Size: Large)   Pulse 80   Temp 100 °F (37.8 °C) (Tympanic)   Resp 16   Ht 5' 6\" (1.676 m)   Wt 98.8 kg (217 lb 12.8 oz)   LMP 02/11/2025   SpO2 96%   BMI 35.15 kg/m²      Physical Exam  Constitutional:       General: She is not in acute distress.     Appearance: She is not ill-appearing.   HENT:      Head: Normocephalic and atraumatic.   Eyes:      General:         Right eye: No discharge.         Left eye: No discharge.      Extraocular Movements: Extraocular movements intact.   Cardiovascular:      Rate and Rhythm: Normal rate.   Pulmonary:      Effort: Pulmonary effort is normal. No respiratory distress.   Skin:     Comments: Soft tissue mass noted on upper back    Neurological:      General: No focal deficit present.      Mental Status: She is alert.   Psychiatric:         Mood and Affect: Mood normal.         Behavior: Behavior normal.         "

## 2025-04-08 NOTE — ASSESSMENT & PLAN NOTE
Orders:    flecainide (TAMBOCOR) 100 mg tablet; Take 1 tablet (100 mg total) by mouth as needed (Take 1 tablet if palps 5 min, repeat if palps last > 25 min)    metoprolol tartrate (LOPRESSOR) 25 mg tablet; Take 1 tablet (25 mg total) by mouth as needed (take 1 tablet as needed for palps lasting 5 min, repeat if palps last > 25 min)    
Statement Selected

## 2025-05-02 DIAGNOSIS — R00.2 PALPITATIONS: ICD-10-CM

## 2025-05-02 RX ORDER — METOPROLOL TARTRATE 25 MG/1
25 TABLET, FILM COATED ORAL AS NEEDED
Qty: 90 TABLET | Refills: 0 | Status: SHIPPED | OUTPATIENT
Start: 2025-05-02

## 2025-05-03 RX ORDER — FLECAINIDE ACETATE 100 MG/1
100 TABLET ORAL AS NEEDED
Qty: 90 TABLET | Refills: 0 | Status: SHIPPED | OUTPATIENT
Start: 2025-05-03

## 2025-05-07 DIAGNOSIS — R61 EXCESSIVE SWEATING: ICD-10-CM

## 2025-05-07 DIAGNOSIS — E03.9 ACQUIRED HYPOTHYROIDISM: ICD-10-CM

## 2025-05-07 RX ORDER — LEVOTHYROXINE SODIUM 50 UG/1
50 TABLET ORAL DAILY
Qty: 90 TABLET | Refills: 1 | Status: SHIPPED | OUTPATIENT
Start: 2025-05-07

## 2025-05-07 RX ORDER — OXYBUTYNIN CHLORIDE 5 MG/1
5 TABLET ORAL DAILY
Qty: 90 TABLET | Refills: 1 | Status: SHIPPED | OUTPATIENT
Start: 2025-05-07

## 2025-05-19 ENCOUNTER — OFFICE VISIT (OUTPATIENT)
Dept: URGENT CARE | Facility: MEDICAL CENTER | Age: 40
End: 2025-05-19
Payer: COMMERCIAL

## 2025-05-19 VITALS
RESPIRATION RATE: 16 BRPM | WEIGHT: 225.6 LBS | HEIGHT: 67 IN | SYSTOLIC BLOOD PRESSURE: 133 MMHG | OXYGEN SATURATION: 95 % | TEMPERATURE: 99.6 F | BODY MASS INDEX: 35.41 KG/M2 | DIASTOLIC BLOOD PRESSURE: 89 MMHG | HEART RATE: 83 BPM

## 2025-05-19 DIAGNOSIS — L72.3 SEBACEOUS CYST: Primary | ICD-10-CM

## 2025-05-19 PROCEDURE — S9083 URGENT CARE CENTER GLOBAL: HCPCS

## 2025-05-19 PROCEDURE — 87205 SMEAR GRAM STAIN: CPT

## 2025-05-19 PROCEDURE — 87070 CULTURE OTHR SPECIMN AEROBIC: CPT

## 2025-05-19 PROCEDURE — G0382 LEV 3 HOSP TYPE B ED VISIT: HCPCS

## 2025-05-19 PROCEDURE — 87077 CULTURE AEROBIC IDENTIFY: CPT

## 2025-05-19 NOTE — PATIENT INSTRUCTIONS
New referral to general surgery placed for follow up.  Wound culture sent, you will be contacted if needed to start on antibiotics.  Wash area daily with soap and water.  Over-the-counter Tylenol and/or Motrin as needed for pain.  Recommend warm soaks to the area for about 15-minute periods 3-4 times a day.    Follow up with PCP in 3-5 days.  Proceed to  ER if symptoms worsen.    If tests are performed, our office will contact you with results only if changes need to made to the care plan discussed with you at the visit. You can review your full results on [facility] Mychart.    Patient Education     Epidermal Cyst   About this topic   An epidermal cyst is a small lump under the skin. It is not cancer. This kind of cyst most often happens on the face, back, chest, neck, scalp, or behind the ears. They can also happen in the genital area. It may be caused by an injury to the skin. Other times, it is caused by a pimple or a problem with a hair follicle. It grows slowly and does not hurt. It can move under the skin when you touch it. It may be flesh-colored or yellowish in color.  Some epidermal cysts go away on their own. Others may hurt or get germs in them. If this happens, the doctor can drain or remove the cyst with surgery.  What are the causes?   Harm to the skin  Problems with a hair follicle  A gland that makes oil gets blocked or bursts  Having a rare health problem called Valladares's syndrome  What can make this more likely to happen?   Being a young or middle aged adult  Having a lot of acne  Being out in the sun a lot  Having an injury to the skin  What are the main signs?   A movable soft lump on the skin  Most often yellow or white in color, but may be darker on darker skinned people  Bump may have a blackhead or tiny opening at the end of it  How does the doctor diagnose this health problem?   Your doctor can tell if you have an epidermal cyst by looking at and feeling the cyst. Your doctor may ask when  you first saw the lump. Your doctor may send you to a skin doctor called a dermatologist.  The doctor will check the lump to see how big it is and if it can be moved.  Your doctor may check the rest of your body to see if there are any other lumps.  You may need a biopsy. Your doctor will take a sample tissue of the cyst and look at it under a microscope.  How does the doctor treat this health problem?   If the cyst does not bother you, your doctor may not treat it at all. If it does cause problems, your doctor can treat it by:  Injecting a drug that can help with swelling  Draining the cyst  Removing the cyst by laser or with a scalpel  If the cyst becomes infected or keeps you from doing your regular activities, the doctor may take out the cyst with surgery.  What lifestyle changes are needed?   Your cyst may be in a spot that makes it painful to move. If you have treatment, you may need to keep from moving that area or applying pressure to that area until the wound heals all the way.  What drugs may be needed?   The doctor may order drugs to:  Help with pain and swelling  Fight an infection  What problems could happen?   Infection  You may get a scar  The cyst may come back  Pain during sex or going to the bathroom if the cyst is in the genital area  Very rarely will they turn into cancer.  What can be done to prevent this health problem?   Keep your face, hands, and skin clean.  Avoid picking at or squeezing your pimples.  Avoid injury to the cyst. Do not try to squeeze, pop, or cut cysts you already have.  Avoid being out in the sun too much.  Last Reviewed Date   2021-07-13  Consumer Information Use and Disclaimer   This generalized information is a limited summary of diagnosis, treatment, and/or medication information. It is not meant to be comprehensive and should be used as a tool to help the user understand and/or assess potential diagnostic and treatment options. It does NOT include all information about  conditions, treatments, medications, side effects, or risks that may apply to a specific patient. It is not intended to be medical advice or a substitute for the medical advice, diagnosis, or treatment of a health care provider based on the health care provider's examination and assessment of a patient’s specific and unique circumstances. Patients must speak with a health care provider for complete information about their health, medical questions, and treatment options, including any risks or benefits regarding use of medications. This information does not endorse any treatments or medications as safe, effective, or approved for treating a specific patient. UpToDate, Inc. and its affiliates disclaim any warranty or liability relating to this information or the use thereof. The use of this information is governed by the Terms of Use, available at https://www.woltersSuninfo Informationuwer.com/en/know/clinical-effectiveness-terms   Copyright   Copyright © 2024 [employer]ToDate, Inc. and its affiliates and/or licensors. All rights reserved.

## 2025-05-19 NOTE — PROGRESS NOTES
[facility]        NAME: Erum Membreno is a 39 y.o. female  : 1985    MRN: [id number]  DATE: May 19, 2025  TIME: 12:53 PM    Assessment and Plan   Sebaceous cyst [L72.3]  1. Sebaceous cyst  Ambulatory Referral to General Surgery    Wound culture and Gram stain    Incision and drain        From PCP visit on 2025 reviewed.  Patient with sebaceous cyst on back.  Attempted removal in office, but patient began to feel lightheaded and that she felt her blood pressure dropping after lidocaine injection, so procedure was aborted.  Referral to general surgery was placed, but no appointment has been made on [facility].    Area already draining prior to visit, area milked, scant drainage noted.  Wound culture sent out.  No signs of infection to area currently - will await wound culture, discussed potential need for antibiotics pending wound culture results.  Advised general surgery referral for full removal of sac/cyst, as was placed previously by PCP, especially due to previous adverse reaction when attempting to remove in the office.  New referral placed today, patient provided with number to call.    Patient Instructions     New referral to general surgery placed for follow up.  Wound culture sent, you will be contacted if needed to start on antibiotics.  Wash area daily with soap and water.  Over-the-counter Tylenol and/or Motrin as needed for pain.  Recommend warm soaks to the area for about 15-minute periods 3-4 times a day.    Follow up with PCP in 3-5 days.  Proceed to  ER if symptoms worsen.    If tests are performed, our office will contact you with results only if changes need to made to the care plan discussed with you at the visit. You can review your full results on [facility].    Chief Complaint     Chief Complaint   Patient presents with    cyst on back     Pt has a cyst on her back. Pt notice the cyst on her back 1 year ago, she popped the cyst 2 days ago, it looks deflated. There is  still some stiff in it. Cyst was painful before it popped.         History of Present Illness       Patient presents for evaluation of a cyst to her back.  She has had this for approximately 1 year.  She was evaluated by her PCP in April, they attempted to remove it, but she had an adverse reaction after lidocaine administration.  Recently the areas seem to be increasing in size and pain.  States 2 days ago she was pressing on the area when it started to drain.  Notes the area was tender prior to draining, since it is drained, the pain has gone away.  It has not continue to drain since she initially popped it.  Denies fever or chills.  Patient states she was referred to general surgery by her PCP but was called by them when at work.  She accidentally declined a call and has not heard from them since.        Review of Systems   Review of Systems   Constitutional:  Negative for chills and fever.   Skin:         + Cyst         Current Medications     Current Medications[1]    Current Allergies     Allergies as of 05/19/2025 - Reviewed 05/19/2025   Allergen Reaction Noted    Cephalexin Fever 06/11/2020            The following portions of the patient's history were reviewed and updated as appropriate: allergies, current medications, past family history, past medical history, past social history, past surgical history and problem list.     Past Medical History:   Diagnosis Date    Acquired hypothyroidism 12/30/2019    Allergic 1996    Seasonal; hay fever    Anxiety     Depression     last assessed 10/20/17    Lactose intolerance in adult        Past Surgical History:   Procedure Laterality Date    EYE SURGERY  5/4/2022    Lasik; expensive       Family History   Problem Relation Age of Onset    No Known Problems Family     Stroke Mother     Cancer Mother         Thyroid    Diabetes Father         Type 2    Cancer Father         Stomach    Hearing loss Father     Cancer Maternal Grandmother         Liver; pancreatic     "Dementia Paternal Grandmother          Medications have been verified.        Objective   /89   Pulse 83   Temp 99.6 °F (37.6 °C) (Tympanic)   Resp 16   Ht 5' 7\" (1.702 m)   Wt 102 kg (225 lb 9.6 oz)   SpO2 95%   BMI 35.33 kg/m²        Physical Exam     Physical Exam  Vitals and nursing note reviewed.   Constitutional:       General: She is not in acute distress.     Appearance: Normal appearance. She is not ill-appearing.     Cardiovascular:      Rate and Rhythm: Normal rate and regular rhythm.      Pulses: Normal pulses.      Heart sounds: Normal heart sounds. No murmur heard.  Pulmonary:      Effort: Pulmonary effort is normal. No respiratory distress.      Breath sounds: Normal breath sounds. No stridor. No wheezing, rhonchi or rales.     Skin:     Comments: Cyst to mid upper back.  No significant surrounding erythema.  No tenderness.  Scant sebum like drainage noted.     Neurological:      Mental Status: She is alert.       Incision and drain    Date/Time: 5/19/2025 12:40 PM    Performed by: Betty Lindsay PA-C  Authorized by: Betty Lindsay PA-C    Universal Protocol:  procedure performed by consultantConsent: Verbal consent obtained  Risks and benefits: risks, benefits and alternatives were discussed  Consent given by: patient  Time out: Immediately prior to procedure a \"time out\" was called to verify the correct patient, procedure, equipment, support staff and site/side marked as required.  Timeout called at: 5/19/2025 12:40 PM.  Patient understanding: patient states understanding of the procedure being performed  Patient identity confirmed: verbally with patient    Location:     Type:  Cyst    Size:  Pea sized    Location:  Trunk    Trunk location:  Back  Pre-procedure details:     Skin preparation:  Antiseptic wash  Anesthesia (see MAR for exact dosages):     Anesthesia method:  None  Procedure details:     Complexity:  Simple    Needle aspiration: no      Incision type: no " incision - already already open and draining.    Approach:  Open    Incision depth:  Subcutaneous    Techniques: area milked.    Drainage characteristics: sebum.    Drainage amount:  Scant    Wound treatment:  Wound left open    Packing materials:  None  Post-procedure details:     Patient tolerance of procedure:  Tolerated well, no immediate complications  Comments:      Area already opened and draining prior to visit.  Milked area with pressure, no incision or opening made.  Small amount of sebum removed.  Wound culture sent out.                     [1]   Current Outpatient Medications:     Black Elderberry,Berry-Flower, 575 MG CAPS, Take by mouth, Disp: , Rfl:     BLISOVI FE 1/20 1-20 MG-MCG per tablet, TAKE 1 TABLET DAILY, Disp: 84 tablet, Rfl: 0    clonazePAM (KlonoPIN) 1 mg tablet, Take 0.5 tablets (0.5 mg total) by mouth 2 (two) times a day as needed for anxiety, Disp: 30 tablet, Rfl: 0    fexofenadine (ALLEGRA) 180 MG tablet, Take 1 tablet by mouth daily as needed, Disp: , Rfl:     flecainide (TAMBOCOR) 100 mg tablet, TAKE 1 TABLET (100 MG TOTAL) BY MOUTH AS NEEDED (TAKE 1 TABLET IF PALPS 5 MIN, REPEAT IF PALPS LAST > 25 MIN), Disp: 90 tablet, Rfl: 0    fluticasone (FLONASE) 50 mcg/act nasal spray, 2 sprays into each nostril in the morning., Disp: , Rfl:     levothyroxine 50 mcg tablet, TAKE 1 TABLET BY MOUTH EVERY DAY, Disp: 90 tablet, Rfl: 1    metoprolol tartrate (LOPRESSOR) 25 mg tablet, TAKE 1 TABLET (25 MG TOTAL) BY MOUTH AS NEEDED (TAKE 1 TABLET AS NEEDED FOR PALPS LASTING 5 MIN, REPEAT IF PALPS LAST > 25 MIN), Disp: 90 tablet, Rfl: 0    Miebo 1.338 GM/ML SOLN, , Disp: , Rfl:     multivitamin (THERAGRAN) TABS, Take 1 tablet by mouth in the morning., Disp: , Rfl:     mupirocin (BACTROBAN) 2 % ointment, Apply topically 3 (three) times a day, Disp: 22 g, Rfl: 0    Olive Leaf Extract 150 MG CAPS, Take 3 capsules by mouth in the morning., Disp: , Rfl:     oxybutynin (DITROPAN) 5 mg tablet, TAKE 1 TABLET BY  MOUTH DAILY, Disp: 90 tablet, Rfl: 1    pregabalin (LYRICA) 50 mg capsule, Take 1 capsule (50 mg total) by mouth 3 (three) times a day, Disp: 270 capsule, Rfl: 1    venlafaxine (EFFEXOR-XR) 150 mg 24 hr capsule, Take 1 capsule (150 mg total) by mouth daily, Disp: 90 capsule, Rfl: 1     H Plasty Text: Given the location of the defect, shape of the defect and the proximity to free margins a H-plasty was deemed most appropriate for repair.  Using a sterile surgical marker, the appropriate advancement arms of the H-plasty were drawn incorporating the defect and placing the expected incisions within the relaxed skin tension lines where possible. The area thus outlined was incised deep to adipose tissue with a #15 scalpel blade. The skin margins were undermined to an appropriate distance in all directions utilizing iris scissors.  The opposing advancement arms were then advanced into place in opposite direction and anchored with interrupted buried subcutaneous sutures.

## 2025-05-21 ENCOUNTER — RESULTS FOLLOW-UP (OUTPATIENT)
Dept: URGENT CARE | Facility: MEDICAL CENTER | Age: 40
End: 2025-05-21

## 2025-05-21 DIAGNOSIS — L08.9 INFECTED CYST OF SKIN: Primary | ICD-10-CM

## 2025-05-21 DIAGNOSIS — L72.9 INFECTED CYST OF SKIN: Primary | ICD-10-CM

## 2025-05-21 LAB
BACTERIA WND AEROBE CULT: ABNORMAL
GRAM STN SPEC: ABNORMAL

## 2025-05-21 RX ORDER — PENICILLIN V POTASSIUM 500 MG/1
500 TABLET, FILM COATED ORAL EVERY 8 HOURS SCHEDULED
Qty: 21 TABLET | Refills: 0 | Status: SHIPPED | OUTPATIENT
Start: 2025-05-21 | End: 2025-05-28

## 2025-05-29 ENCOUNTER — OFFICE VISIT (OUTPATIENT)
Dept: BARIATRICS | Facility: CLINIC | Age: 40
End: 2025-05-29
Payer: COMMERCIAL

## 2025-05-29 VITALS
RESPIRATION RATE: 16 BRPM | SYSTOLIC BLOOD PRESSURE: 124 MMHG | TEMPERATURE: 97.7 F | WEIGHT: 216 LBS | BODY MASS INDEX: 34.72 KG/M2 | DIASTOLIC BLOOD PRESSURE: 88 MMHG | HEIGHT: 66 IN | HEART RATE: 97 BPM

## 2025-05-29 DIAGNOSIS — E03.9 ACQUIRED HYPOTHYROIDISM: ICD-10-CM

## 2025-05-29 DIAGNOSIS — E66.812 OBESITY, CLASS II, BMI 35-39.9: Primary | ICD-10-CM

## 2025-05-29 PROCEDURE — 99204 OFFICE O/P NEW MOD 45 MIN: CPT | Performed by: PHYSICIAN ASSISTANT

## 2025-05-29 RX ORDER — TIRZEPATIDE 2.5 MG/.5ML
2.5 INJECTION, SOLUTION SUBCUTANEOUS WEEKLY
Qty: 2 ML | Refills: 0 | Status: SHIPPED | OUTPATIENT
Start: 2025-05-29 | End: 2025-06-26

## 2025-05-29 NOTE — PROGRESS NOTES
Assessment & Plan  Obesity, Class II, BMI 35-39.9  -Discussed options of HealthyCORE-Intensive Lifestyle Intervention Program, Very Low Calorie Diet-VLCD, and Conservative Program and the role of weight loss medications  -Explained the importance of making lifestyle changes if utilizing medication to aid in weight loss  -Discussed that obesity is a chronic disease and medications are typically used long term as stopped medication will increase risk of weight gain.   -Initial weight loss goal of 5-10% weight loss for improved health  -Screening labs and records reviewed from prior  - STOP BANG- 1/8  - Initial Weight: 216  - Goal Weight: To improve health and appearance.     -Patient is interested in pursuing Conservative Program    Goals:  -Recommend Referral to Registered Dietitian  -Specific calorie goals not provided at visit due to lack of time, until visit with RD suggest Reduced portions, reduced calorie intake from sweetened beverages such as soda, gatorade, and kombucha  -Consider having english muffin in place of croissant for lower calorie  -To drink at least 64oz of water daily. No sugary beverages.  -Exercise is limited due to pain, suggest increasing movement when possible.   -Would benefit from meeting with a therapist due to depression/anxiety and has referral from PCP. Until that time, would suggest meeting with  to discuss coping with life stressors  and body image concerns.     Discussed medication options  Recommend treatment with Zepbound  Discussed expected weight loss of approximately 20% along with lifestyle modifications  Discussed risks/side effects of medication and demonstrated pen device  Recommend small/low fat meals and stop eating when full to avoid side effects.  Discussed importance of adequate protein intake and strength training to reduce risk of muscle loss.   Discussed need to stop medication for at least 1 week prior to planned surgery/endoscopy  Denies hx  Pancreatitis or FH of Medullary cell Thyroid CA/MEN2 syndrome  *She is not certain of type of thyroid Cancer that her mother had, encouraged her to find on this information. It was likely papillary or follicular as she recalls that her mother needed to take a pill and be in isolation and I-131 therapy not used in treatment of medullary thyroid CA. Discussed contraindication for medullary thyroid CA.  She is comfortable with taking medication.   Start Zepbound  2.5mg weekly x 4 weeks  Advised to contact office in 2 weeks with update regarding tolerability and at that time will increase to 5mg dose if tolerating medication with no significant side effects.    On OCP but not sexually active.          Acquired hypothyroidism  TSH normal on recent testing.        Return in about 3 months (around 8/29/2025) for  and RD visit.         Subjective:   Chief Complaint   Patient presents with    Consult     Mwm Consult: waist: 39in. sb:1/8       Patient ID: Erum Membreno  is a 39 y.o. female with excess weight/obesity here to pursue weight management.    HPI: Here for MWM consult    Obesity/Excess Weight:  Current BMI: Body mass index is 35.4 kg/m².   Onset:  Over 10 years ago    Previous Weight Loss Attempts: Diet and Exercise  Prior to 2020 was working with   Contributing factors: Change in birth control with 4 switches in OCP  Back pain/pinched nerves  Exercise limited due to pack pain/tendinitis  Weight Related Comorbidities: Hyperlipidemia     Current Weight: 206  Lowest Weight: 114-- in college, was depressed and no interested in food at that time  145 about 10 years ago  Highest Weight: 206  Goal Weight: want to feel better, look better.  Unhappy with the way she looks    Brother is taking Wegovy and doing very well with it.       Food Recall:  Breakfast: 3AM Fruit or yogurt before lunch  Chobani Lactose free yogurt  Gets croissant at work   Frequently tasting pastries as a  while  "working.   Lunch: 11AM break-- Sometimes nothing, weekly sushi 1 roll  Or leftovers at home  Dinner: Lives with parents, trying to grocery shop and choose healthy options but not always possible. Parents are on restricted diets due to hx cancer/taste issues  Chicken, Vegetables.  Not too any starchy foods  Sometimes, pasta weekly. Simple foods her mom can cook if needed   Bedtime Snack: Sometimes snacksat night-- chex mix  Has had lactose Intolerance    Hydration: Kombucha daily in AM (synergy)  Soda-- 20 ounces per day   Water/gatorade 50/50 mix-- (regular seltzer)    Alcohol: Not much  Exercise: none currently   Occupation: Starts work at 3AM-- [employer]      she  denies personal and family history of  pancreatitis, Medullary Cell Thyroid Cancer, or MEN-2 tumors.  Mother has history of thyroid cancer  Not sure on type  Reports remembering her mom taking a radioactive pill and needed to stay away from family after it.     Wt Readings from Last 20 Encounters:   05/29/25 98 kg (216 lb)   05/19/25 102 kg (225 lb 9.6 oz)   04/08/25 98.8 kg (217 lb 12.8 oz)   03/12/25 98.9 kg (218 lb)   09/11/24 97.1 kg (214 lb)   03/06/24 96.5 kg (212 lb 12.8 oz)   05/18/23 97 kg (213 lb 12.8 oz)   11/17/22 96.2 kg (212 lb)   08/18/22 94.2 kg (207 lb 9.6 oz)   02/17/22 95.7 kg (211 lb)   10/18/21 94.8 kg (209 lb)   07/15/21 92.4 kg (203 lb 12.8 oz)   04/05/21 90.5 kg (199 lb 8 oz)   03/12/21 93.4 kg (206 lb)   01/14/21 93.6 kg (206 lb 6.4 oz)   07/07/20 91.2 kg (201 lb)   04/06/20 88.9 kg (196 lb)   01/27/20 86.2 kg (190 lb)   01/03/20 91.1 kg (200 lb 12.8 oz)   06/11/19 89.1 kg (196 lb 6.4 oz)        Past Medical History[1]    Past Surgical History[2]     Allergies[3]     Review of Systems   Musculoskeletal:  Positive for arthralgias and back pain.       Objective:    /88   Pulse 97   Temp 97.7 °F (36.5 °C)   Resp 16   Ht 5' 5.5\" (1.664 m)   Wt 98 kg (216 lb)   LMP 05/14/2025 (Approximate)   BMI 35.40 " kg/m²     Physical Exam:  Constitutional:  she  appears well-developed and well-nourished. No distress.   HENT:   Head: Normocephalic and atraumatic.   Neck: Normal range of motion.   Pulmonary/Chest: Effort normal.   Musculoskeletal: Normal range of motion.   Neurological: she  is alert and oriented to person, place, and time.   Skin: she  is not diaphoretic.   Psychiatric: she  has a normal mood and affect. her  behavior is normal.      Labs:    Lab Results   Component Value Date    HGBA1C 5.6 03/15/2025      03/15/2025   Lab Results   Component Value Date    SODIUM 139 03/15/2025    K 4.1 03/15/2025     03/15/2025    CO2 25 03/15/2025    AGAP 10 03/15/2025    BUN 16 03/15/2025    CREATININE 0.96 03/15/2025    GLUC 87 03/12/2021    GLUF 82 03/15/2025    CALCIUM 9.5 03/15/2025    AST 14 03/15/2025    ALT 15 03/15/2025    ALKPHOS 54 03/15/2025    TP 7.3 03/15/2025    TBILI 0.47 03/15/2025    EGFR 75 03/15/2025      Lab Results   Component Value Date    MWR2MMFGJUWH 2.554 03/15/2025           [1]   Past Medical History:  Diagnosis Date    Acquired hypothyroidism 12/30/2019    Allergic 1996    Seasonal; hay fever    Anxiety     Depression     last assessed 10/20/17    Lactose intolerance in adult    [2]   Past Surgical History:  Procedure Laterality Date    EYE SURGERY  5/4/2022    Lasik; expensive   [3]   Allergies  Allergen Reactions    Cephalexin Fever

## 2025-05-29 NOTE — PROGRESS NOTES
Weight Management Medical Nutrition Assessment  Erum presented for a meal planning session. Today's weight is 217#. Interest in Zepbound and waiting for prior auth. Hx hypothyroidism and hyperlipidemia. She is an . She starts her day at 3AM. Lives with her parents and is responsible for meals/grocery shopping. Notes her parents have dietary restrictions and she tries to plan meals that satisfy her family. This can be challenging at times. Discovered she is lactose intolerant 6 months ago. Taking Lactaid as needed. Per dietary recall patient consumes an inadequate amount of fluids and protein. Work allows her to keep water bottle nearby. She will keep a food journal to track fluid and protein intake. Educated patient on protein sources and ways to create satisfying meals. We developed and reviewed a low calorie meal plan.     Patient seen by Medical Provider in past 6 months:  yes  Requested to schedule appointment with Medical Provider: No      Anthropometric Measurements  Start Weight (#) & Date: 216# 5/28/25  Current Weight (#): 217#  TBW % Change from start weight: + 1#  Ideal Body Weight (#): 127.5#  Goal Weight (#): wants to feel better  Highest: 206#  Lowest: 114# (in college, was depressed and had no interest in food)    Weight Loss History  Previous weight loss attempts: Exercise  Self Created Diets (Portion Control, Healthy Food Choices, etc.)    Food and Nutrition Related History  Wake up: 2AM   Bed Time: 9PM (will nap during the day)    Starts works at 3AM- [employer].    Food Recall  Breakfast: 3AM Kombucha + Chobani lactose free 20g yogurt     Snack: skip  Lunch: 11AM Sushi from [employer]  Snack: Peanut Butter Chex or Frosted mini wheats  Dinner: diced chicken, chickpeas, carrots, lettuce, oranges, Balsamic dressing (homemade) OR pasta, Prego sauce OR taco salad with chicken, cheddar, carrots, black beans, peppers, Chipotle dressing  Snack: skip    Beverages: 20oz water, 1/2  Charley Rashid, occasional tea  Alcohol: not often   Volume of beverage intake: inadequate, see above    Weekends: Same  Cravings: crunchy snacks  Trouble area of day: early to mid afternoon, 1-5PM    Frequency of Eating out: infrequently  Food restrictions: lactose intolerant  Cooking: self   Food Shopping: self    Physical Activity Intake  Activity: none currently  Frequency: n/a  Physical limitations/barriers to exercise: back pain/tendinitis. Seeing a chiropractor.    Estimated Needs  Energy  SECA: BMR: n/a      X 1.3 -1000 =  [place] [address] [place] Energy Needs: BMR : 1669   1-2# loss weekly sedentary:  2838-3909             1-2# loss weekly lightly active: 7128-1924  Maintenance calories for sedentary activity level: 2002  Protein: 70-87      (1.2-1.5g/kg IBW)  Fluid Requirement Calculator   Total Fluid: 103   oz  (Kaleigh-Segar Method)  Free Fluid: 82 oz (Kaleigh-Segar Method - 20%)    Nutrition Diagnosis  Yes;    Overweight/obesity  related to Food and nutrition related knowledge deficit as evidenced by  BMI more than normative standard for age and sex (obesity-grade II 35-39.9)       Nutrition Intervention    Nutrition Prescription  Calories: 6915-2574  Protein: 75-85  Fluid: 82    Meal Plan (Jay/Pro/Carb)  Breakfast: 20-25g  Snack: 5-10g  Lunch: 20-25g  Snack: 5-10g  Dinner: 20-25g  Snack:    Nutrition Education:    Calorie controlled menu  Lean protein food choices  Healthy snack options  Food journaling tips      Nutrition Counseling:  Strategies: meal planning, portion sizes, healthy snack choices, hydration, fiber intake, protein intake, exercise, food journal      Monitoring and Evaluation:  Evaluation criteria:  Energy Intake  Meet protein needs  Maintain adequate hydration  Monitor weekly weight  Meal planning/preparation  Food journal   Decreased portions at mealtimes and snacks  Physical activity     Barriers to learning:none  Readiness to change: Preparation:  (Getting ready to change)    Comprehension: good  Expected Compliance: good

## 2025-06-04 ENCOUNTER — CLINICAL SUPPORT (OUTPATIENT)
Dept: BARIATRICS | Facility: CLINIC | Age: 40
End: 2025-06-04

## 2025-06-04 VITALS — BODY MASS INDEX: 34.87 KG/M2 | HEIGHT: 66 IN | WEIGHT: 217 LBS

## 2025-06-04 DIAGNOSIS — F32.A CHRONIC DEPRESSION: Primary | ICD-10-CM

## 2025-06-04 DIAGNOSIS — R63.5 ABNORMAL WEIGHT GAIN: Primary | ICD-10-CM

## 2025-06-04 DIAGNOSIS — R63.5 ABNORMAL WEIGHT GAIN: ICD-10-CM

## 2025-06-04 PROCEDURE — WMDI30

## 2025-06-04 PROCEDURE — RECHECK

## 2025-06-04 NOTE — PROGRESS NOTES
Provider: XUAN Jacinto planning: JACKSON Kapadia  Referral  placed for Psychiatry:  talk therapy  Referred to discuss coping skills around stress      3am -11:30am

## 2025-06-06 ENCOUNTER — TELEPHONE (OUTPATIENT)
Dept: BARIATRICS | Facility: CLINIC | Age: 40
End: 2025-06-06

## 2025-06-06 NOTE — TELEPHONE ENCOUNTER
PA for Zepbound 2.5 mg SUBMITTED to [employer] [facility]    via    [x]CMM-KEY: [id number]  []Surescripts-Case ID #    []Availity-Auth ID #  NDC #    []Faxed to plan   []Other website    []Phone call Case ID #      [x]PA sent as URGENT    All office notes, labs and other pertaining documents and studies sent. Clinical questions answered. Awaiting determination from insurance company.     Turnaround time for your insurance to make a decision on your Prior Authorization can take 7-21 business days.

## 2025-07-29 ENCOUNTER — TELEPHONE (OUTPATIENT)
Age: 40
End: 2025-07-29

## 2025-07-30 DIAGNOSIS — R00.2 PALPITATIONS: ICD-10-CM

## 2025-07-31 RX ORDER — METOPROLOL TARTRATE 25 MG/1
25 TABLET, FILM COATED ORAL AS NEEDED
Qty: 90 TABLET | Refills: 1 | Status: SHIPPED | OUTPATIENT
Start: 2025-07-31

## 2025-07-31 RX ORDER — FLECAINIDE ACETATE 100 MG/1
100 TABLET ORAL AS NEEDED
Qty: 90 TABLET | Refills: 0 | Status: SHIPPED | OUTPATIENT
Start: 2025-07-31

## 2025-08-04 ENCOUNTER — CLINICAL SUPPORT (OUTPATIENT)
Dept: BARIATRICS | Facility: CLINIC | Age: 40
End: 2025-08-04

## 2025-08-04 VITALS — WEIGHT: 217 LBS | BODY MASS INDEX: 34.87 KG/M2 | HEIGHT: 66 IN

## 2025-08-04 DIAGNOSIS — R63.5 ABNORMAL WEIGHT GAIN: Primary | ICD-10-CM

## 2025-08-04 PROCEDURE — RECHECK

## 2025-08-04 PROCEDURE — DB3PK

## 2025-08-22 ENCOUNTER — TELEPHONE (OUTPATIENT)
Age: 40
End: 2025-08-22